# Patient Record
Sex: FEMALE | Race: WHITE | NOT HISPANIC OR LATINO | Employment: OTHER | RURAL
[De-identification: names, ages, dates, MRNs, and addresses within clinical notes are randomized per-mention and may not be internally consistent; named-entity substitution may affect disease eponyms.]

---

## 2022-03-30 ENCOUNTER — OFFICE VISIT (OUTPATIENT)
Dept: FAMILY MEDICINE CLINIC | Facility: CLINIC | Age: 72
End: 2022-03-30

## 2022-03-30 VITALS
HEART RATE: 93 BPM | OXYGEN SATURATION: 96 % | BODY MASS INDEX: 27.16 KG/M2 | WEIGHT: 163 LBS | HEIGHT: 65 IN | DIASTOLIC BLOOD PRESSURE: 100 MMHG | SYSTOLIC BLOOD PRESSURE: 158 MMHG

## 2022-03-30 DIAGNOSIS — E11.9 TYPE 2 DIABETES MELLITUS WITHOUT COMPLICATION, WITHOUT LONG-TERM CURRENT USE OF INSULIN: Primary | ICD-10-CM

## 2022-03-30 DIAGNOSIS — I10 BENIGN ESSENTIAL HYPERTENSION: ICD-10-CM

## 2022-03-30 PROCEDURE — 99214 OFFICE O/P EST MOD 30 MIN: CPT | Performed by: FAMILY MEDICINE

## 2022-03-30 RX ORDER — LISINOPRIL AND HYDROCHLOROTHIAZIDE 12.5; 1 MG/1; MG/1
1 TABLET ORAL DAILY
Qty: 90 TABLET | Refills: 1 | Status: SHIPPED | OUTPATIENT
Start: 2022-03-30 | End: 2022-10-06

## 2022-03-30 RX ORDER — METFORMIN HYDROCHLORIDE 500 MG/1
1 TABLET, EXTENDED RELEASE ORAL 2 TIMES DAILY
COMMUNITY
Start: 2021-11-05 | End: 2022-03-30 | Stop reason: SDUPTHER

## 2022-03-30 RX ORDER — FERROUS SULFATE 325(65) MG
325 TABLET ORAL
COMMUNITY
End: 2022-10-06

## 2022-03-30 RX ORDER — GLIPIZIDE 5 MG/1
1 TABLET, FILM COATED, EXTENDED RELEASE ORAL
COMMUNITY
Start: 2021-11-05 | End: 2022-03-30 | Stop reason: SDUPTHER

## 2022-03-30 RX ORDER — GLIPIZIDE 5 MG/1
5 TABLET, FILM COATED, EXTENDED RELEASE ORAL DAILY
Qty: 90 TABLET | Refills: 1 | Status: SHIPPED | OUTPATIENT
Start: 2022-03-30 | End: 2022-10-06 | Stop reason: SDUPTHER

## 2022-03-30 RX ORDER — METFORMIN HYDROCHLORIDE 500 MG/1
500 TABLET, EXTENDED RELEASE ORAL
Qty: 90 TABLET | Refills: 1 | Status: SHIPPED | OUTPATIENT
Start: 2022-03-30 | End: 2022-10-06 | Stop reason: SDUPTHER

## 2022-03-30 RX ORDER — LISINOPRIL AND HYDROCHLOROTHIAZIDE 12.5; 1 MG/1; MG/1
TABLET ORAL
COMMUNITY
Start: 2021-11-05 | End: 2022-03-30 | Stop reason: SDUPTHER

## 2022-03-30 NOTE — PROGRESS NOTES
Follow Up Office Visit      Date of Visit:  2022   Patient Name: Catherine Moran  : 1950   MRN: [id number]     Chief Complaint:    Chief Complaint   Patient presents with   • Establish Care   • Diabetes       History of Present Illness: Catherine Moran is a 71 y.o. female who is here today for follow up.  She is a new patient to us.  Has being seen by Dr. Hurst.  He is currently on medical leave.  She has a history of hypertension and diabetes.  Blood pressure is somewhat elevated today.  Very anxious person.  I was able to review her previous labs done in February.  Her A1c was very elevated.  She is extremely reluctant to take any medicines at all let alone new medication.  We did have a discussion about how often she is checking her blood pressure and sugars.  HPI      Subjective      Review of Systems:   Review of Systems   Constitutional: Negative for fatigue and fever.   HENT: Negative for congestion and ear pain.    Respiratory: Negative for apnea, cough, chest tightness and shortness of breath.    Cardiovascular: Negative for chest pain.   Gastrointestinal: Negative for abdominal pain, constipation, diarrhea and nausea.   Musculoskeletal: Negative for arthralgias.   Psychiatric/Behavioral: Negative for depressed mood and stress.       Past Medical History:   Past Medical History:   Diagnosis Date   • Anxiety    • Benign essential hypertension     MEDICAL MANAGEMENT   • Impaired fasting glucose     DRUG THERAPY   • Iron deficiency anemia    • Type 2 diabetes mellitus without complication (HCC)        Past Surgical History:   Past Surgical History:   Procedure Laterality Date   • COLONOSCOPY  2014    POLYPS X2; TUBULAR ADENOMAS-REPEAT 5 YEARS   • ENDOSCOPY  2014    GERD SYMPTOMS;NORMAL   • HEMORRHOIDECTOMY  2013       Family History:   Family History   Problem Relation Age of Onset   • Hypertension Mother    • Other Father         BLOOD DISEASE   • Hypertension Father   "      Social History:   Social History     Socioeconomic History   • Marital status:    Tobacco Use   • Smoking status: Never Smoker   • Smokeless tobacco: Never Used       Medications:     Current Outpatient Medications:   •  ferrous sulfate 325 (65 FE) MG tablet, Take 325 mg by mouth Daily With Breakfast., Disp: , Rfl:   •  glipizide (GLUCOTROL XL) 5 MG ER tablet, Take 1 tablet by mouth Daily., Disp: 90 tablet, Rfl: 1  •  lisinopril-hydrochlorothiazide (PRINZIDE,ZESTORETIC) 10-12.5 MG per tablet, Take 1 tablet by mouth Daily., Disp: 90 tablet, Rfl: 1  •  metFORMIN ER (GLUCOPHAGE-XR) 500 MG 24 hr tablet, Take 1 tablet by mouth Daily With Breakfast., Disp: 90 tablet, Rfl: 1    Allergies:   No Known Allergies    Objective     Physical Exam:  Vital Signs:   Vitals:    03/30/22 0955   BP: 158/100   Pulse: 93   SpO2: 96%   Weight: 73.9 kg (163 lb)   Height: 165.1 cm (65\")     Body mass index is 27.12 kg/m².     Physical Exam  Vitals and nursing note reviewed.   Constitutional:       General: She is not in acute distress.     Appearance: Normal appearance. She is not ill-appearing.   HENT:      Head: Normocephalic and atraumatic.      Right Ear: Tympanic membrane and ear canal normal.      Left Ear: Tympanic membrane and ear canal normal.      Nose: Nose normal.   Cardiovascular:      Rate and Rhythm: Normal rate and regular rhythm.      Heart sounds: Normal heart sounds.   Pulmonary:      Effort: Pulmonary effort is normal.      Breath sounds: Normal breath sounds.   Neurological:      Mental Status: She is alert and oriented to person, place, and time. Mental status is at baseline.   Psychiatric:         Mood and Affect: Mood normal.         Procedures    BMI is above normal parameters. Recommendations: nutrition counseling/recommendations        Assessment / Plan      Assessment/Plan:   Diagnoses and all orders for this visit:    1. Type 2 diabetes mellitus without complication, without long-term current use of " insulin (HCC) (Primary)    2. Benign essential hypertension    Other orders  -     glipizide (GLUCOTROL XL) 5 MG ER tablet; Take 1 tablet by mouth Daily.  Dispense: 90 tablet; Refill: 1  -     lisinopril-hydrochlorothiazide (PRINZIDE,ZESTORETIC) 10-12.5 MG per tablet; Take 1 tablet by mouth Daily.  Dispense: 90 tablet; Refill: 1  -     metFORMIN ER (GLUCOPHAGE-XR) 500 MG 24 hr tablet; Take 1 tablet by mouth Daily With Breakfast.  Dispense: 90 tablet; Refill: 1         We have agreed that she will keep a blood pressure and blood sugar log for the next 6 weeks.  She is going to be more aggressive on her diet and exercise.  We will base decisions on adjustments of medication based on the next 6 weeks.  Schedule follow-up visit at that time.  Refilled medications as we are for now.    Follow Up:   Return in about 6 weeks (around 5/11/2022).    Zach Quintanilla  [facility] [place] [facility] [place]

## 2022-05-11 ENCOUNTER — OFFICE VISIT (OUTPATIENT)
Dept: FAMILY MEDICINE CLINIC | Facility: CLINIC | Age: 72
End: 2022-05-11

## 2022-05-11 VITALS
HEIGHT: 65 IN | HEART RATE: 64 BPM | WEIGHT: 164 LBS | SYSTOLIC BLOOD PRESSURE: 136 MMHG | DIASTOLIC BLOOD PRESSURE: 80 MMHG | BODY MASS INDEX: 27.32 KG/M2 | OXYGEN SATURATION: 98 %

## 2022-05-11 DIAGNOSIS — I10 BENIGN ESSENTIAL HYPERTENSION: ICD-10-CM

## 2022-05-11 DIAGNOSIS — E11.9 TYPE 2 DIABETES MELLITUS WITHOUT COMPLICATION, WITHOUT LONG-TERM CURRENT USE OF INSULIN: Primary | ICD-10-CM

## 2022-05-11 PROCEDURE — 99214 OFFICE O/P EST MOD 30 MIN: CPT | Performed by: FAMILY MEDICINE

## 2022-05-11 RX ORDER — DAPAGLIFLOZIN 10 MG/1
10 TABLET, FILM COATED ORAL DAILY
Qty: 30 TABLET | Refills: 2 | Status: SHIPPED | OUTPATIENT
Start: 2022-05-11 | End: 2022-10-06

## 2022-05-11 RX ORDER — BLOOD SUGAR DIAGNOSTIC
STRIP MISCELLANEOUS
COMMUNITY
Start: 2022-04-14 | End: 2022-12-14 | Stop reason: SDUPTHER

## 2022-05-12 NOTE — PROGRESS NOTES
Follow Up Office Visit      Date of Visit:  2022   Patient Name: Catherine Moran  : 1950   MRN: [id number]     Chief Complaint:    Chief Complaint   Patient presents with   • Diabetes       History of Present Illness: Catherine Moran is a 71 y.o. female who is here today for follow up.  Patient comes in today to follow-up on her diabetes and hypertension.  Recent blood work reviewed today.  Patient brought blood pressure readings and blood sugar readings in.  Her blood pressure readings at home and here are better than at our last visit.  Overall I think her current medication management is appropriate for her blood pressure.  Her diabetes however is very greatly uncontrolled.  She is currently taking glipizide and metformin.  Her A1c was extremely elevated and her blood sugars at home are as well.  She has been trying to work on diet.  She is extremely reluctant to take any additional medication.  Her current medications really are not going to make a big difference by increasing them.  She does have side effects of diarrhea with any more metformin.        Subjective      Review of Systems:   Review of Systems   Constitutional: Negative for fatigue and fever.   HENT: Negative for congestion and ear pain.    Respiratory: Negative for apnea, cough, chest tightness and shortness of breath.    Cardiovascular: Negative for chest pain.   Gastrointestinal: Negative for abdominal pain, constipation, diarrhea and nausea.   Musculoskeletal: Negative for arthralgias.   Psychiatric/Behavioral: Negative for depressed mood and stress.       Past Medical History:   Past Medical History:   Diagnosis Date   • Anxiety    • Benign essential hypertension     MEDICAL MANAGEMENT   • Impaired fasting glucose     DRUG THERAPY   • Iron deficiency anemia    • Type 2 diabetes mellitus without complication (HCC)        Past Surgical History:   Past Surgical History:   Procedure Laterality Date   • COLONOSCOPY  2014     "POLYPS X2; TUBULAR ADENOMAS-REPEAT 5 YEARS   • ENDOSCOPY  07/24/2014    GERD SYMPTOMS;NORMAL   • HEMORRHOIDECTOMY  07/31/2013       Family History:   Family History   Problem Relation Age of Onset   • Hypertension Mother    • Other Father         BLOOD DISEASE   • Hypertension Father        Social History:   Social History     Socioeconomic History   • Marital status:    Tobacco Use   • Smoking status: Never Smoker   • Smokeless tobacco: Never Used       Medications:     Current Outpatient Medications:   •  Accu-Chek Arlette Plus test strip, , Disp: , Rfl:   •  Dapagliflozin Propanediol (Farxiga) 10 MG tablet, Take 10 mg by mouth Daily., Disp: 30 tablet, Rfl: 2  •  ferrous sulfate 325 (65 FE) MG tablet, Take 325 mg by mouth Daily With Breakfast., Disp: , Rfl:   •  glipizide (GLUCOTROL XL) 5 MG ER tablet, Take 1 tablet by mouth Daily., Disp: 90 tablet, Rfl: 1  •  lisinopril-hydrochlorothiazide (PRINZIDE,ZESTORETIC) 10-12.5 MG per tablet, Take 1 tablet by mouth Daily., Disp: 90 tablet, Rfl: 1  •  metFORMIN ER (GLUCOPHAGE-XR) 500 MG 24 hr tablet, Take 1 tablet by mouth Daily With Breakfast., Disp: 90 tablet, Rfl: 1    Allergies:   No Known Allergies    Objective     Physical Exam:  Vital Signs:   Vitals:    05/11/22 0955   BP: 136/80   BP Location: Left arm   Patient Position: Sitting   Cuff Size: Adult   Pulse: 64   SpO2: 98%   Weight: 74.4 kg (164 lb)   Height: 165.1 cm (65\")   PainSc: 0-No pain     Body mass index is 27.29 kg/m².     Physical Exam  Vitals and nursing note reviewed.   Constitutional:       General: She is not in acute distress.     Appearance: Normal appearance. She is not ill-appearing.   HENT:      Head: Normocephalic and atraumatic.      Right Ear: Tympanic membrane and ear canal normal.      Left Ear: Tympanic membrane and ear canal normal.      Nose: Nose normal.   Cardiovascular:      Rate and Rhythm: Normal rate and regular rhythm.      Heart sounds: Normal heart sounds.   Pulmonary:      " Effort: Pulmonary effort is normal.      Breath sounds: Normal breath sounds.   Neurological:      Mental Status: She is alert and oriented to person, place, and time. Mental status is at baseline.   Psychiatric:         Mood and Affect: Mood normal.         Procedures    BMI is >= 25 and < 30. (Overweight) The following options were offered after discussion: exercise counseling/recommendations and nutrition counseling/recommendations       Assessment / Plan      Assessment/Plan:   Diagnoses and all orders for this visit:    1. Type 2 diabetes mellitus without complication, without long-term current use of insulin (HCC) (Primary)  -     Cancel: CBC Auto Differential; Future  -     Cancel: Comprehensive Metabolic Panel; Future  -     Cancel: Lipid Panel; Future  -     Cancel: TSH; Future  -     Cancel: Hemoglobin A1c; Future  -     CBC Auto Differential; Future  -     Comprehensive Metabolic Panel; Future  -     Hemoglobin A1c; Future  -     Lipid Panel; Future  -     TSH; Future    2. Benign essential hypertension  -     Cancel: TSH; Future  -     TSH; Future    Other orders  -     Dapagliflozin Propanediol (Farxiga) 10 MG tablet; Take 10 mg by mouth Daily.  Dispense: 30 tablet; Refill: 2         We had a very lengthy discussion today about her medication.  I do think it safe to continue her current medication for her blood pressure.  There is adequate control.  I do not feel that her current regimen with her diabetes is adequately controlling her sugars at all.  Patient very reluctant to take anything else for her blood pressure.  I did give her a prescription for Farxiga to try.  I think this would have less potential side effects and most of the medicines we could give.  I want to see her back in 3 months for evaluation with labs.    Follow Up:   Return in about 3 months (around 8/11/2022) for Recheck.    Zach Quintanilla  [facility] [place] [facility] [place]

## 2022-08-15 ENCOUNTER — TELEPHONE (OUTPATIENT)
Dept: FAMILY MEDICINE CLINIC | Facility: CLINIC | Age: 72
End: 2022-08-15

## 2022-08-15 DIAGNOSIS — E11.9 TYPE 2 DIABETES MELLITUS WITHOUT COMPLICATION, WITHOUT LONG-TERM CURRENT USE OF INSULIN: Primary | ICD-10-CM

## 2022-08-15 DIAGNOSIS — D50.8 OTHER IRON DEFICIENCY ANEMIA: ICD-10-CM

## 2022-08-15 NOTE — TELEPHONE ENCOUNTER
Caller: Catherine Moran    Relationship: Self    Best call back number: [phone]    What orders are you requesting (i.e. lab or imaging): LAB ORDER FOR APPOINTMENT ON 10/06/2022    In what timeframe would the patient need to come in: A WEEK BEFORE HER APPOINTMENT     Where will you receive your lab/imaging services: IN OFFICE     Additional notes: THE PATIENT WOULD LIKE TO KNOW IF SHE CAN HAVE BLOOD WORK DONE FOR HER APPOINTMENT ON 10/06/2022 PLEASE GIVE PATIENT A CALL TO LET HER KNOW IF THAT CAN BE SCHEDULED

## 2022-09-29 ENCOUNTER — LAB (OUTPATIENT)
Dept: FAMILY MEDICINE CLINIC | Facility: CLINIC | Age: 72
End: 2022-09-29

## 2022-09-29 DIAGNOSIS — D50.8 OTHER IRON DEFICIENCY ANEMIA: ICD-10-CM

## 2022-09-29 DIAGNOSIS — E11.9 TYPE 2 DIABETES MELLITUS WITHOUT COMPLICATION, WITHOUT LONG-TERM CURRENT USE OF INSULIN: ICD-10-CM

## 2022-09-29 PROCEDURE — 36415 COLL VENOUS BLD VENIPUNCTURE: CPT | Performed by: FAMILY MEDICINE

## 2022-09-30 ENCOUNTER — TELEPHONE (OUTPATIENT)
Dept: FAMILY MEDICINE CLINIC | Facility: CLINIC | Age: 72
End: 2022-09-30

## 2022-09-30 LAB
ALBUMIN SERPL-MCNC: 4.3 G/DL (ref 3.7–4.7)
ALBUMIN/GLOB SERPL: 1.6 {RATIO} (ref 1.2–2.2)
ALP SERPL-CCNC: 86 IU/L (ref 44–121)
ALT SERPL-CCNC: 17 IU/L (ref 0–32)
AST SERPL-CCNC: 15 IU/L (ref 0–40)
BASOPHILS # BLD AUTO: 0.1 X10E3/UL (ref 0–0.2)
BASOPHILS NFR BLD AUTO: 1 %
BILIRUB SERPL-MCNC: 0.9 MG/DL (ref 0–1.2)
BUN SERPL-MCNC: 14 MG/DL (ref 8–27)
BUN/CREAT SERPL: 19 (ref 12–28)
CALCIUM SERPL-MCNC: 9.8 MG/DL (ref 8.7–10.3)
CHLORIDE SERPL-SCNC: 97 MMOL/L (ref 96–106)
CHOLEST SERPL-MCNC: 238 MG/DL (ref 100–199)
CO2 SERPL-SCNC: 24 MMOL/L (ref 20–29)
CREAT SERPL-MCNC: 0.73 MG/DL (ref 0.57–1)
EGFRCR SERPLBLD CKD-EPI 2021: 88 ML/MIN/1.73
EOSINOPHIL # BLD AUTO: 0 X10E3/UL (ref 0–0.4)
EOSINOPHIL NFR BLD AUTO: 1 %
ERYTHROCYTE [DISTWIDTH] IN BLOOD BY AUTOMATED COUNT: 12 % (ref 11.7–15.4)
FERRITIN SERPL-MCNC: 73 NG/ML (ref 15–150)
FOLATE SERPL-MCNC: >20 NG/ML
GLOBULIN SER CALC-MCNC: 2.7 G/DL (ref 1.5–4.5)
GLUCOSE SERPL-MCNC: 298 MG/DL (ref 70–99)
HBA1C MFR BLD: 11.9 % (ref 4.8–5.6)
HCT VFR BLD AUTO: 43.4 % (ref 34–46.6)
HDLC SERPL-MCNC: 45 MG/DL
HGB BLD-MCNC: 14.2 G/DL (ref 11.1–15.9)
IMM GRANULOCYTES # BLD AUTO: 0 X10E3/UL (ref 0–0.1)
IMM GRANULOCYTES NFR BLD AUTO: 0 %
IRON SATN MFR SERPL: 32 % (ref 15–55)
IRON SERPL-MCNC: 100 UG/DL (ref 27–139)
LDLC SERPL CALC-MCNC: 147 MG/DL (ref 0–99)
LYMPHOCYTES # BLD AUTO: 1.6 X10E3/UL (ref 0.7–3.1)
LYMPHOCYTES NFR BLD AUTO: 24 %
MCH RBC QN AUTO: 30 PG (ref 26.6–33)
MCHC RBC AUTO-ENTMCNC: 32.7 G/DL (ref 31.5–35.7)
MCV RBC AUTO: 92 FL (ref 79–97)
MONOCYTES # BLD AUTO: 0.4 X10E3/UL (ref 0.1–0.9)
MONOCYTES NFR BLD AUTO: 7 %
NEUTROPHILS # BLD AUTO: 4.4 X10E3/UL (ref 1.4–7)
NEUTROPHILS NFR BLD AUTO: 67 %
PLATELET # BLD AUTO: 296 X10E3/UL (ref 150–450)
POTASSIUM SERPL-SCNC: 4.6 MMOL/L (ref 3.5–5.2)
PROT SERPL-MCNC: 7 G/DL (ref 6–8.5)
RBC # BLD AUTO: 4.74 X10E6/UL (ref 3.77–5.28)
SODIUM SERPL-SCNC: 137 MMOL/L (ref 134–144)
T4 FREE SERPL-MCNC: 1.35 NG/DL (ref 0.82–1.77)
TIBC SERPL-MCNC: 308 UG/DL (ref 250–450)
TRIGL SERPL-MCNC: 253 MG/DL (ref 0–149)
TSH SERPL DL<=0.005 MIU/L-ACNC: 2.25 UIU/ML (ref 0.45–4.5)
UIBC SERPL-MCNC: 208 UG/DL (ref 118–369)
VIT B12 SERPL-MCNC: 840 PG/ML (ref 232–1245)
VLDLC SERPL CALC-MCNC: 46 MG/DL (ref 5–40)
WBC # BLD AUTO: 6.5 X10E3/UL (ref 3.4–10.8)

## 2022-09-30 NOTE — TELEPHONE ENCOUNTER
----- Message from Adolph Hurst MD sent at 9/30/2022  8:01 AM EDT -----  THE MESSAGE BELOW IS ABLE TO BE GIVEN BY THE HUB.  THE HUB MAY SCHEDULE A FOLLOW-UP VISIT FOR THE PATIENT IF INDICATED IN THE MESSAGE BELOW...    Please call and let patient know that her recent blood work showed very poor diabetes control with hemoglobin A1c up to 11.9 and poor cholesterol control.    Please have her keep her appointment with us scheduled for October 6th and we will go over labs together and discuss medication options to get better cholesterol and diabetes control given her recent blood work results.    It is very important that we get her diabetes and cholesterol under better control

## 2022-10-06 ENCOUNTER — OFFICE VISIT (OUTPATIENT)
Dept: FAMILY MEDICINE CLINIC | Facility: CLINIC | Age: 72
End: 2022-10-06

## 2022-10-06 VITALS
TEMPERATURE: 97.3 F | HEART RATE: 84 BPM | SYSTOLIC BLOOD PRESSURE: 150 MMHG | WEIGHT: 160.4 LBS | HEIGHT: 65 IN | DIASTOLIC BLOOD PRESSURE: 92 MMHG | BODY MASS INDEX: 26.73 KG/M2 | OXYGEN SATURATION: 95 %

## 2022-10-06 DIAGNOSIS — E78.2 HYPERLIPIDEMIA, MIXED: ICD-10-CM

## 2022-10-06 DIAGNOSIS — Z11.59 NEED FOR HEPATITIS C SCREENING TEST: ICD-10-CM

## 2022-10-06 DIAGNOSIS — Z12.11 SCREENING FOR COLON CANCER: ICD-10-CM

## 2022-10-06 DIAGNOSIS — E11.65 TYPE 2 DIABETES MELLITUS WITH HYPERGLYCEMIA, WITHOUT LONG-TERM CURRENT USE OF INSULIN: ICD-10-CM

## 2022-10-06 DIAGNOSIS — Z00.00 GENERAL MEDICAL EXAM: Primary | ICD-10-CM

## 2022-10-06 DIAGNOSIS — D50.8 IRON DEFICIENCY ANEMIA SECONDARY TO INADEQUATE DIETARY IRON INTAKE: ICD-10-CM

## 2022-10-06 DIAGNOSIS — Z12.31 SCREENING MAMMOGRAM FOR BREAST CANCER: ICD-10-CM

## 2022-10-06 DIAGNOSIS — Z13.820 SCREENING FOR OSTEOPOROSIS: ICD-10-CM

## 2022-10-06 DIAGNOSIS — I10 HYPERTENSION, ESSENTIAL: ICD-10-CM

## 2022-10-06 PROCEDURE — G0439 PPPS, SUBSEQ VISIT: HCPCS | Performed by: FAMILY MEDICINE

## 2022-10-06 PROCEDURE — 99214 OFFICE O/P EST MOD 30 MIN: CPT | Performed by: FAMILY MEDICINE

## 2022-10-06 PROCEDURE — 99397 PER PM REEVAL EST PAT 65+ YR: CPT | Performed by: FAMILY MEDICINE

## 2022-10-06 PROCEDURE — 1170F FXNL STATUS ASSESSED: CPT | Performed by: FAMILY MEDICINE

## 2022-10-06 PROCEDURE — 1159F MED LIST DOCD IN RCRD: CPT | Performed by: FAMILY MEDICINE

## 2022-10-06 PROCEDURE — 96160 PT-FOCUSED HLTH RISK ASSMT: CPT | Performed by: FAMILY MEDICINE

## 2022-10-06 RX ORDER — GLIPIZIDE 10 MG/1
10 TABLET, FILM COATED, EXTENDED RELEASE ORAL DAILY
Qty: 90 TABLET | Refills: 1 | Status: SHIPPED | OUTPATIENT
Start: 2022-10-06 | End: 2023-02-01 | Stop reason: SDUPTHER

## 2022-10-06 RX ORDER — VALSARTAN AND HYDROCHLOROTHIAZIDE 160; 12.5 MG/1; MG/1
1 TABLET, FILM COATED ORAL DAILY
Qty: 90 TABLET | Refills: 1 | Status: SHIPPED | OUTPATIENT
Start: 2022-10-06 | End: 2023-02-01

## 2022-10-06 RX ORDER — LANCETS
EACH MISCELLANEOUS
COMMUNITY
Start: 2022-09-12

## 2022-10-06 RX ORDER — LISINOPRIL AND HYDROCHLOROTHIAZIDE 12.5; 1 MG/1; MG/1
1 TABLET ORAL DAILY
Qty: 90 TABLET | Refills: 1 | Status: CANCELLED | OUTPATIENT
Start: 2022-10-06

## 2022-10-06 RX ORDER — METFORMIN HYDROCHLORIDE 500 MG/1
500 TABLET, EXTENDED RELEASE ORAL
Qty: 90 TABLET | Refills: 1 | Status: SHIPPED | OUTPATIENT
Start: 2022-10-06 | End: 2023-02-01 | Stop reason: SDUPTHER

## 2022-10-06 NOTE — ASSESSMENT & PLAN NOTE
Blood pressure is uncontrolled.  Having problems with ACE induced cough.  We will change her Diovan HCT.  We will follow-up in 1 month.

## 2022-10-06 NOTE — ASSESSMENT & PLAN NOTE
Lipid abnormalities are unchanged.  Lipid-lowering therapy was not prescribed due to patient refusal.  Lipids will be reassessed in 6 months.

## 2022-10-06 NOTE — ASSESSMENT & PLAN NOTE
Reviewed together health maintenance, screening, vaccinations, and advance directive recommendation.    We discussed past-due mammogram, DEXA, and cancer screening, and vaccination update (flu, Prevnar 20, Shingrix, and COVID booster).    She declines vaccination update.    Declines advance directive.    She declines getting mammogram or bone density up-to-date.    She declines colon cancer screening with colonoscopy or Cologuard.    Diabetic eye exam up-to-date.    Diabetic foot exam completed today.

## 2022-10-06 NOTE — ASSESSMENT & PLAN NOTE
Diabetes is worsening.     Unable to give urine sample for microalbumin.    Given cough ongoing with lisinopril and blood pressure being poorly controlled we will change from lisinopril HCTZ to Diovan HCTZ.    She does not want increase her metformin above 500 mg a day given she is concerned that her loose bowel movements contributed to her iron deficiency.  We did go up to 10 mg with her glipizide but I discussed this is not going to be sufficient to get her diabetes under better control given her A1c is over 11.    We did discuss Ozempic and I gave her a sample and instructed her on use today.    We also discussed with her poor diabetes control and difficulty with medications it would be advantageous for her to see endocrinology for further assistance with her diabetes management and to help get her connected with the diabetes educator.    She is willing to see endocrinology as long as she can see the [facility] endocrinologist that comes to the [place] office location.    Scheduling consultation with endocrinology and again reinforced the importance of seeing a diabetic educator.    She continues to decline statin treatment for hyperlipidemia.  Understands importance of cardiovascular risk reduction with statins in diabetics but refuses to take statin medication.

## 2022-10-06 NOTE — PROGRESS NOTES
QUICK REFERENCE INFORMATION:  The ABCs of the Annual Wellness Visit    Subsequent Medicare Wellness Visit    @[email or web address]@    HEALTH RISK ASSESSMENT    1950    Recent Hospitalizations:  No hospitalization(s) within the last year..        Current Medical Providers:  Patient Care Team:  Adolph Hurst MD as PCP - General (Family Medicine)        Smoking Status:  Social History     Tobacco Use   Smoking Status Never Smoker   Smokeless Tobacco Never Used       Alcohol Consumption:  Social History     Substance and Sexual Activity   Alcohol Use Never       Depression Screen:   PHQ-2/PHQ-9 Depression Screening 10/6/2022   Little Interest or Pleasure in Doing Things 0-->not at all   Feeling Down, Depressed or Hopeless 0-->not at all   PHQ-9: Brief Depression Severity Measure Score 0       Health Habits and Functional and Cognitive Screening:  Functional & Cognitive Status 10/6/2022   Do you have difficulty preparing food and eating? No   Do you have difficulty bathing yourself, getting dressed or grooming yourself? No   Do you have difficulty using the toilet? No   Do you have difficulty moving around from place to place? No   Do you have trouble with steps or getting out of a bed or a chair? No   Current Diet Low Carb Diet   Dental Exam Up to date   Eye Exam Up to date   Exercise (times per week) 0 times per week   Current Exercises Include No Regular Exercise   Do you need help using the phone?  No   Are you deaf or do you have serious difficulty hearing?  No   Do you need help with transportation? No   Do you need help shopping? No   Do you need help preparing meals?  No   Do you need help with housework?  No   Do you need help with laundry? No   Do you need help taking your medications? No   Do you need help managing money? No   Do you ever drive or ride in a car without wearing a seat belt? No   Have you felt unusual stress, anger or loneliness in the last month? No   Who do you live with? Alone   If you need  help, do you have trouble finding someone available to you? No   Have you been bothered in the last four weeks by sexual problems? No   Do you have difficulty concentrating, remembering or making decisions? No       Fall Risk Screen:  MELANIA Fall Risk Assessment was completed, and patient is at LOW risk for falls.Assessment completed on:3/30/2022    ACE III MINI        Does the patient have evidence of cognitive impairment? No    Aspirin use counseling: Start ASA 81 mg daily     Recent Lab Results:  CMP:  Lab Results   Component Value Date    BUN 14 09/29/2022    CREATININE 0.73 09/29/2022    BCR 19 09/29/2022     09/29/2022    K 4.6 09/29/2022    CO2 24 09/29/2022    CALCIUM 9.8 09/29/2022    PROTENTOTREF 7.0 09/29/2022    ALBUMIN 4.3 09/29/2022    LABGLOBREF 2.7 09/29/2022    LABIL2 1.6 09/29/2022    BILITOT 0.9 09/29/2022    ALKPHOS 86 09/29/2022    AST 15 09/29/2022    ALT 17 09/29/2022     HbA1c:  Lab Results   Component Value Date    HGBA1C 11.9 (H) 09/29/2022     Microalbumin:  No results found for: MICROALBUR, POCMALB, POCCREAT  Lipid Panel  Lab Results   Component Value Date    TRIG 253 (H) 09/29/2022    HDL 45 09/29/2022     (H) 09/29/2022    AST 15 09/29/2022    ALT 17 09/29/2022       Visual Acuity:  No exam data present    Age-appropriate Screening Schedule:  Refer to the list below for future screening recommendations based on patient's age, sex and/or medical conditions. Orders for these recommended tests are listed in the plan section. The patient has been provided with a written plan.    Health Maintenance   Topic Date Due   • URINE MICROALBUMIN  Never done   • HEMOGLOBIN A1C  03/29/2023   • DIABETIC EYE EXAM  09/09/2023   • LIPID PANEL  09/29/2023   • DIABETIC FOOT EXAM  10/06/2023   • TDAP/TD VACCINES (4 - Td or Tdap) 03/22/2032   • INFLUENZA VACCINE  Discontinued   • DXA SCAN  Discontinued   • ZOSTER VACCINE  Discontinued        Subjective   History of Present Illness    Catherine  Dean is a 72 y.o. female who presents for a Subsequent Wellness Visit.    She is also here to review her recent fasting blood work together.    Review of Walk Score chart data for historical review (copied and edited for chart update):  Here for followup visit and to review recent fasting labs. Fasting labwork done last week.     Diabetes control has continued to worsen.    She continues to feel better with iron infusions and improving iron stores and resolution of her anemia after consultation with hematology)     She has stayed off pravachol. She stopped given concerns with past mild CK elevation. No myalgias or symptoms. Discussed benefits of statins with diabetic patients and recommended restarting - pt again declines and understands increased CV risk being off a statin     Diabetic eye exam with Dr Carvalho Office     Urine microalbumin pos 2/2020 - continues on lisinopril for BP and renal protection.  Unable to give urine sample for microalbumin today    Due for repeat colonoscopy in July 2019 given polyp history. She does not want repeat colonoscopy at this time. Neg take-home FIT testing in Nov 2018 with worsening iron def anemia workup. Does not wan annual FIT testing or Cologuard every 3 yrs.     Past due for pelvic/pap/breast/mammogram - understands but declines    DEXA normal June 2019. Declines repeat DEXA    Tdap uptodate in 2012. Declines [id number], [id number], Prevnar 20, flu, HepA, and shingrix.     Encouraged past-due COVID-19 booster.    CHRONIC CONDITIONS    The following portions of the patient's history were reviewed and updated as appropriate: allergies, current medications, past family history, past medical history, past social history, past surgical history and problem list.    Outpatient Medications Prior to Visit   Medication Sig Dispense Refill   • Accu-Chek Arlette Plus test strip      • glipizide (GLUCOTROL XL) 5 MG ER tablet Take 1 tablet by mouth Daily. 90 tablet 1   •  lisinopril-hydrochlorothiazide (PRINZIDE,ZESTORETIC) 10-12.5 MG per tablet Take 1 tablet by mouth Daily. 90 tablet 1   • metFORMIN ER (GLUCOPHAGE-XR) 500 MG 24 hr tablet Take 1 tablet by mouth Daily With Breakfast. 90 tablet 1   • Accu-Chek Softclix Lancets lancets      • Dapagliflozin Propanediol (Farxiga) 10 MG tablet Take 10 mg by mouth Daily. 30 tablet 2   • ferrous sulfate 325 (65 FE) MG tablet Take 325 mg by mouth Daily With Breakfast.       No facility-administered medications prior to visit.       Patient Active Problem List   Diagnosis   • Type 2 diabetes mellitus without complication (HCC)   • Benign essential hypertension   • Anxiety state   • General medical exam   • Type 2 diabetes mellitus with hyperglycemia, without long-term current use of insulin (HCC)   • Iron deficiency anemia secondary to inadequate dietary iron intake   • Hypertension, essential   • Hyperlipidemia, mixed   • Screening mammogram for breast cancer   • Screening for colon cancer   • Need for hepatitis C screening test   • Screening for osteoporosis       Advance Care Planning:  ACP discussion was held with the patient during this visit. Patient does not have an advance directive, information provided.    Identification of Risk Factors:  Risk factors include: Advance Directive Discussion  Breast Cancer/Mammogram Screening  Cardiovascular risk  Colon Cancer Screening  Dementia/Memory   Fall Risk  Immunizations Discussed/Encouraged (specific immunizations; Influenza, Prevnar 20 (Pneumococcal 20-valent conjugate), Shingrix and COVID19 )  Obesity/Overweight .    Review of Systems   Constitutional: Negative for appetite change, chills, fever and unexpected weight change.   HENT: Negative for hearing loss.    Eyes: Negative for visual disturbance.   Respiratory: Positive for cough. Negative for chest tightness, shortness of breath and wheezing.    Cardiovascular: Negative for chest pain, palpitations and leg swelling.   Gastrointestinal:  Negative for abdominal pain.   Musculoskeletal: Negative for arthralgias, back pain and gait problem.   Skin: Negative for rash.   Neurological: Negative for dizziness and headaches.   Psychiatric/Behavioral: Negative for agitation and confusion. The patient is not nervous/anxious.        Compared to one year ago, the patient feels her physical health is the same.  Compared to one year ago, the patient feels her mental health is the same.    Objective     Physical Exam  Constitutional:       Appearance: She is obese.   HENT:      Head: Normocephalic.      Right Ear: External ear normal.      Left Ear: External ear normal.      Nose: Nose normal.   Eyes:      Pupils: Pupils are equal, round, and reactive to light.   Cardiovascular:      Rate and Rhythm: Normal rate and regular rhythm.      Pulses:           Dorsalis pedis pulses are 1+ on the right side and 1+ on the left side.        Posterior tibial pulses are 1+ on the right side and 1+ on the left side.      Heart sounds: Normal heart sounds.   Pulmonary:      Effort: Pulmonary effort is normal.      Breath sounds: Normal breath sounds.   Musculoskeletal:         General: Normal range of motion.      Cervical back: Normal range of motion.      Right foot: Normal range of motion. No deformity, bunion or foot drop.      Left foot: Normal range of motion. No deformity, bunion or foot drop.   Feet:      Right foot:      Protective Sensation: 10 sites tested. 10 sites sensed.      Skin integrity: Skin integrity normal.      Toenail Condition: Right toenails are normal.      Left foot:      Protective Sensation: 10 sites tested. 10 sites sensed.      Skin integrity: Skin integrity normal.      Toenail Condition: Left toenails are normal.      Comments: Diabetic Foot Exam Performed and Monofilament Test Performed     Skin:     General: Skin is warm and dry.   Neurological:      General: No focal deficit present.      Mental Status: She is alert.   Psychiatric:          "Mood and Affect: Mood normal.         Behavior: Behavior normal.         Thought Content: Thought content normal.          Procedures     Vitals:    10/06/22 0802   BP: 150/92   BP Location: Right arm   Patient Position: Sitting   Cuff Size: Adult   Pulse: 84   Temp: 97.3 °F (36.3 °C)   TempSrc: Temporal   SpO2: 95%   Weight: 72.8 kg (160 lb 6.4 oz)   Height: 165.1 cm (65\")       BMI is >= 25 and <30. (Overweight) The following options were offered after discussion;: exercise counseling/recommendations and nutrition counseling/recommendations      Lab Results   Component Value Date    WBC 6.5 09/29/2022    HGB 14.2 09/29/2022    HCT 43.4 09/29/2022    MCV 92 09/29/2022     09/29/2022     Lab Results   Component Value Date    GLUCOSE 298 (H) 09/29/2022    BUN 14 09/29/2022    CREATININE 0.73 09/29/2022    BCR 19 09/29/2022    K 4.6 09/29/2022    CO2 24 09/29/2022    CALCIUM 9.8 09/29/2022    PROTENTOTREF 7.0 09/29/2022    ALBUMIN 4.3 09/29/2022    LABIL2 1.6 09/29/2022    AST 15 09/29/2022    ALT 17 09/29/2022     Lab Results   Component Value Date    TSH 2.250 09/29/2022     No results found for: PSA  Lab Results   Component Value Date    CHLPL 238 (H) 09/29/2022    TRIG 253 (H) 09/29/2022    HDL 45 09/29/2022     (H) 09/29/2022       Assessment & Plan   Problem List Items Addressed This Visit        Cardiac and Vasculature    Hypertension, essential (Chronic)    Current Assessment & Plan     Blood pressure is uncontrolled.  Having problems with ACE induced cough.  We will change her Diovan HCT.  We will follow-up in 1 month.         Relevant Medications    valsartan-hydrochlorothiazide (Diovan HCT) 160-12.5 MG per tablet    Hyperlipidemia, mixed (Chronic)    Current Assessment & Plan     Lipid abnormalities are unchanged.  Lipid-lowering therapy was not prescribed due to patient refusal.  Lipids will be reassessed in 6 months.            Endocrine and Metabolic    Type 2 diabetes mellitus with " hyperglycemia, without long-term current use of insulin (HCC) (Chronic)    Current Assessment & Plan     Diabetes is worsening.     Unable to give urine sample for microalbumin.    Given cough ongoing with lisinopril and blood pressure being poorly controlled we will change from lisinopril HCTZ to Diovan HCTZ.    She does not want increase her metformin above 500 mg a day given she is concerned that her loose bowel movements contributed to her iron deficiency.  We did go up to 10 mg with her glipizide but I discussed this is not going to be sufficient to get her diabetes under better control given her A1c is over 11.    We did discuss Ozempic and I gave her a sample and instructed her on use today.    We also discussed with her poor diabetes control and difficulty with medications it would be advantageous for her to see endocrinology for further assistance with her diabetes management and to help get her connected with the diabetes educator.    She is willing to see endocrinology as long as she can see the [facility] endocrinologist that comes to the [place] office location.    Scheduling consultation with endocrinology and again reinforced the importance of seeing a diabetic educator.    She continues to decline statin treatment for hyperlipidemia.  Understands importance of cardiovascular risk reduction with statins in diabetics but refuses to take statin medication.         Relevant Medications    metFORMIN ER (GLUCOPHAGE-XR) 500 MG 24 hr tablet    glipizide (GLUCOTROL XL) 10 MG 24 hr tablet    Semaglutide,0.25 or 0.5MG/DOS, (OZEMPIC) 2 MG/1.5ML solution pen-injector    valsartan-hydrochlorothiazide (Diovan HCT) 160-12.5 MG per tablet    Other Relevant Orders    Ambulatory Referral to Endocrinology       Gastrointestinal Abdominal     Screening for colon cancer    Current Assessment & Plan     Declines            Health Encounters    General medical exam - Primary    Current Assessment & Plan     Reviewed  together health maintenance, screening, vaccinations, and advance directive recommendation.    We discussed past-due mammogram, DEXA, and cancer screening, and vaccination update (flu, Prevnar 20, Shingrix, and COVID booster).    She declines vaccination update.    Declines advance directive.    She declines getting mammogram or bone density up-to-date.    She declines colon cancer screening with colonoscopy or Cologuard.    Diabetic eye exam up-to-date.    Diabetic foot exam completed today.            Hematology and Neoplasia    Iron deficiency anemia secondary to inadequate dietary iron intake (Chronic)    Current Assessment & Plan     Hematology follow-up ongoing.  Continues on infusions when needed.            Infectious Diseases    Need for hepatitis C screening test    Current Assessment & Plan       Declines            Musculoskeletal and Injuries    Screening for osteoporosis    Current Assessment & Plan     Declines            Other    Screening mammogram for breast cancer    Current Assessment & Plan     Declines             Patient Self-Management and Personalized Health Advice  The patient has been provided with information about: diet, exercise and weight management and preventive services including:   · Annual Wellness Visit (AWV)  · Bone Density Measurements  · Colorectal Cancer Screening, Colonoscopy  · Colorectal Cancer Screening, Cologuard Test   · Hepatitis C Virus Screening (beneficiaries must fall into one of the following categories to be eligible- high risk for HCV infection, born between 9624-9640, or history of blood transfusion before 1992)  · Pneumococcal Vaccine and Administration  · Screening for Cervical Cancer with Human Papillomavirus (HPV) Tests  · Screening Mammography .    Outpatient Encounter Medications as of 10/6/2022   Medication Sig Dispense Refill   • Accu-Chek Arlette Plus test strip      • glipizide (GLUCOTROL XL) 10 MG 24 hr tablet Take 1 tablet by mouth Daily. 90 tablet 1    • metFORMIN ER (GLUCOPHAGE-XR) 500 MG 24 hr tablet Take 1 tablet by mouth Daily With Breakfast. 90 tablet 1   • [DISCONTINUED] glipizide (GLUCOTROL XL) 5 MG ER tablet Take 1 tablet by mouth Daily. 90 tablet 1   • [DISCONTINUED] lisinopril-hydrochlorothiazide (PRINZIDE,ZESTORETIC) 10-12.5 MG per tablet Take 1 tablet by mouth Daily. 90 tablet 1   • [DISCONTINUED] metFORMIN ER (GLUCOPHAGE-XR) 500 MG 24 hr tablet Take 1 tablet by mouth Daily With Breakfast. 90 tablet 1   • Accu-Chek Softclix Lancets lancets      • Semaglutide,0.25 or 0.5MG/DOS, (OZEMPIC) 2 MG/1.5ML solution pen-injector Inject 0.25 mg under the skin into the appropriate area as directed 1 (One) Time Per Week. 1.5 mL 3   • valsartan-hydrochlorothiazide (Diovan HCT) 160-12.5 MG per tablet Take 1 tablet by mouth Daily. FOR BLOOD PRESSURE (Replaces lisinopril/hctz) 90 tablet 1   • [DISCONTINUED] Dapagliflozin Propanediol (Farxiga) 10 MG tablet Take 10 mg by mouth Daily. 30 tablet 2   • [DISCONTINUED] ferrous sulfate 325 (65 FE) MG tablet Take 325 mg by mouth Daily With Breakfast.       No facility-administered encounter medications on file as of 10/6/2022.       Reviewed use of high risk medication in the elderly: yes  Reviewed for potential of harmful drug interactions in the elderly: yes    Diagnoses and all orders for this visit:    1. General medical exam (Primary)  Assessment & Plan:  Reviewed together health maintenance, screening, vaccinations, and advance directive recommendation.    We discussed past-due mammogram, DEXA, and cancer screening, and vaccination update (flu, Prevnar 20, Shingrix, and COVID booster).    She declines vaccination update.    Declines advance directive.    She declines getting mammogram or bone density up-to-date.    She declines colon cancer screening with colonoscopy or Cologuard.    Diabetic eye exam up-to-date.    Diabetic foot exam completed today.      2. Type 2 diabetes mellitus with hyperglycemia, without  long-term current use of insulin ([facility])  Assessment & Plan:  Diabetes is worsening.     Unable to give urine sample for microalbumin.    Given cough ongoing with lisinopril and blood pressure being poorly controlled we will change from lisinopril HCTZ to Diovan HCTZ.    She does not want increase her metformin above 500 mg a day given she is concerned that her loose bowel movements contributed to her iron deficiency.  We did go up to 10 mg with her glipizide but I discussed this is not going to be sufficient to get her diabetes under better control given her A1c is over 11.    We did discuss Ozempic and I gave her a sample and instructed her on use today.    We also discussed with her poor diabetes control and difficulty with medications it would be advantageous for her to see endocrinology for further assistance with her diabetes management and to help get her connected with the diabetes educator.    She is willing to see endocrinology as long as she can see the [facility] endocrinologist that comes to the [place] office location.    Scheduling consultation with endocrinology and again reinforced the importance of seeing a diabetic educator.    She continues to decline statin treatment for hyperlipidemia.  Understands importance of cardiovascular risk reduction with statins in diabetics but refuses to take statin medication.    Orders:  -     metFORMIN ER (GLUCOPHAGE-XR) 500 MG 24 hr tablet; Take 1 tablet by mouth Daily With Breakfast.  Dispense: 90 tablet; Refill: 1  -     glipizide (GLUCOTROL XL) 10 MG 24 hr tablet; Take 1 tablet by mouth Daily.  Dispense: 90 tablet; Refill: 1  -     Semaglutide,0.25 or 0.5MG/DOS, (OZEMPIC) 2 MG/1.5ML solution pen-injector; Inject 0.25 mg under the skin into the appropriate area as directed 1 (One) Time Per Week.  Dispense: 1.5 mL; Refill: 3  -     Ambulatory Referral to Endocrinology  -     valsartan-hydrochlorothiazide (Diovan HCT) 160-12.5 MG per tablet; Take 1 tablet by  mouth Daily. FOR BLOOD PRESSURE (Replaces lisinopril/hctz)  Dispense: 90 tablet; Refill: 1    3. Iron deficiency anemia secondary to inadequate dietary iron intake  Assessment & Plan:  Hematology follow-up ongoing.  Continues on infusions when needed.      4. Hypertension, essential  Assessment & Plan:  Blood pressure is uncontrolled.  Having problems with ACE induced cough.  We will change her Diovan HCT.  We will follow-up in 1 month.    Orders:  -     valsartan-hydrochlorothiazide (Diovan HCT) 160-12.5 MG per tablet; Take 1 tablet by mouth Daily. FOR BLOOD PRESSURE (Replaces lisinopril/hctz)  Dispense: 90 tablet; Refill: 1    5. Hyperlipidemia, mixed  Assessment & Plan:  Lipid abnormalities are unchanged.  Lipid-lowering therapy was not prescribed due to patient refusal.  Lipids will be reassessed in 6 months.      6. Screening mammogram for breast cancer  Assessment & Plan:  Declines      7. Screening for colon cancer  Assessment & Plan:  Declines      8. Need for hepatitis C screening test  Assessment & Plan:    Declines      9. Screening for osteoporosis  Assessment & Plan:  Declines        Follow Up:  Return in about 3 months (around 1/6/2023) for Med recheck.     There are no Patient Instructions on file for this visit.    An After Visit Summary and PPPS with all of these plans were given to the patient.

## 2022-11-23 ENCOUNTER — TELEPHONE (OUTPATIENT)
Dept: FAMILY MEDICINE CLINIC | Facility: CLINIC | Age: 72
End: 2022-11-23

## 2022-12-14 ENCOUNTER — TELEPHONE (OUTPATIENT)
Dept: FAMILY MEDICINE CLINIC | Facility: CLINIC | Age: 72
End: 2022-12-14

## 2022-12-14 NOTE — TELEPHONE ENCOUNTER
PATIENT MADE CONTACT TO CHECK THE STATUS OF HER EARLIER REQUEST FOR TEST STRIPS TO BE REFILLED AND SENT TO [facility]    PHARMACY CONTACT:    [phone]    PATIENT STATED PHARMACY RECEIVED REFILL   FOR THE LANCETS BUT NOT THE TEST STRIPS, AND SHE IS COMPLETELY OUT OF THE TEST STRIPS AT THIS TIME    DR POLLACK

## 2022-12-14 NOTE — TELEPHONE ENCOUNTER
Caller: Catherine Moran    Relationship: Self    Best call back number: [phone]    Requested Prescriptions:   Requested Prescriptions      No prescriptions requested or ordered in this encounter      LANCETS   TEST STRIPS    Pharmacy where request should be sent:      [facility] [id number] [place] [id number] [facility] [address] - [phone]  - [phone]       Additional details provided by patient:     COMPLETELY OUT OF TEST STRIPS.  THE PRESCRIPTION HAS     Does the patient have less than a 3 day supply:  [x] Yes  [] No    Would you like a call back once the refill request has been completed: [] Yes [x] No    If the office needs to give you a call back, can they leave a voicemail: [x] Yes [] No    Joseu Lopes Rep   22 10:57 EST

## 2022-12-27 ENCOUNTER — TELEPHONE (OUTPATIENT)
Dept: FAMILY MEDICINE CLINIC | Facility: CLINIC | Age: 72
End: 2022-12-27

## 2022-12-27 DIAGNOSIS — D50.8 OTHER IRON DEFICIENCY ANEMIA: ICD-10-CM

## 2022-12-27 DIAGNOSIS — E11.65 TYPE 2 DIABETES MELLITUS WITH HYPERGLYCEMIA, WITHOUT LONG-TERM CURRENT USE OF INSULIN: Primary | ICD-10-CM

## 2022-12-27 NOTE — TELEPHONE ENCOUNTER
Caller: Catherine Moran    Relationship: Self     Best call back number: [phone]    What orders are you requesting (i.e. lab or imaging): BLOOD WORK     In what timeframe would the patient need to come in: N/A    Where will you receive your lab/imaging services:     Additional notes: PATIENT STATED SHE WOULD LIKE TO COME GET BLOOD WORK PRIOR TO HER APPOINTMENT; PATIENT WOULD LIKE TO LET PROVIDER KNOW SHE HAD TO RESCHEDULE WITH ANOTHER PROVIDER SO THAT SHE COULD HAVE MORNING APPOINTMENT AND WANTED TO MAKE SURE HE WAS OKAY WITH THIS    PLEASE ADVISE

## 2023-01-26 ENCOUNTER — LAB (OUTPATIENT)
Dept: FAMILY MEDICINE CLINIC | Facility: CLINIC | Age: 73
End: 2023-01-26
Payer: MEDICARE

## 2023-01-26 DIAGNOSIS — E11.65 TYPE 2 DIABETES MELLITUS WITH HYPERGLYCEMIA, WITHOUT LONG-TERM CURRENT USE OF INSULIN: ICD-10-CM

## 2023-01-26 DIAGNOSIS — D50.8 OTHER IRON DEFICIENCY ANEMIA: ICD-10-CM

## 2023-01-26 PROCEDURE — 36415 COLL VENOUS BLD VENIPUNCTURE: CPT | Performed by: FAMILY MEDICINE

## 2023-01-27 LAB
ALBUMIN SERPL-MCNC: 4.4 G/DL (ref 3.7–4.7)
ALBUMIN/GLOB SERPL: 1.7 {RATIO} (ref 1.2–2.2)
ALP SERPL-CCNC: 86 IU/L (ref 44–121)
ALT SERPL-CCNC: 17 IU/L (ref 0–32)
AST SERPL-CCNC: 21 IU/L (ref 0–40)
BASOPHILS # BLD AUTO: 0 X10E3/UL (ref 0–0.2)
BASOPHILS NFR BLD AUTO: 1 %
BILIRUB SERPL-MCNC: 0.8 MG/DL (ref 0–1.2)
BUN SERPL-MCNC: 14 MG/DL (ref 8–27)
BUN/CREAT SERPL: 19 (ref 12–28)
CALCIUM SERPL-MCNC: 9.5 MG/DL (ref 8.7–10.3)
CHLORIDE SERPL-SCNC: 102 MMOL/L (ref 96–106)
CHOLEST SERPL-MCNC: 229 MG/DL (ref 100–199)
CO2 SERPL-SCNC: 24 MMOL/L (ref 20–29)
CREAT SERPL-MCNC: 0.74 MG/DL (ref 0.57–1)
EGFRCR SERPLBLD CKD-EPI 2021: 86 ML/MIN/1.73
EOSINOPHIL # BLD AUTO: 0.1 X10E3/UL (ref 0–0.4)
EOSINOPHIL NFR BLD AUTO: 1 %
ERYTHROCYTE [DISTWIDTH] IN BLOOD BY AUTOMATED COUNT: 12.8 % (ref 11.7–15.4)
FERRITIN SERPL-MCNC: 31 NG/ML (ref 15–150)
GLOBULIN SER CALC-MCNC: 2.6 G/DL (ref 1.5–4.5)
GLUCOSE SERPL-MCNC: 168 MG/DL (ref 70–99)
HBA1C MFR BLD: 8.6 % (ref 4.8–5.6)
HCT VFR BLD AUTO: 41.2 % (ref 34–46.6)
HDLC SERPL-MCNC: 45 MG/DL
HGB BLD-MCNC: 13.6 G/DL (ref 11.1–15.9)
IMM GRANULOCYTES # BLD AUTO: 0 X10E3/UL (ref 0–0.1)
IMM GRANULOCYTES NFR BLD AUTO: 0 %
IRON SATN MFR SERPL: 20 % (ref 15–55)
IRON SERPL-MCNC: 74 UG/DL (ref 27–139)
LDLC SERPL CALC-MCNC: 137 MG/DL (ref 0–99)
LYMPHOCYTES # BLD AUTO: 1.4 X10E3/UL (ref 0.7–3.1)
LYMPHOCYTES NFR BLD AUTO: 21 %
MCH RBC QN AUTO: 28.4 PG (ref 26.6–33)
MCHC RBC AUTO-ENTMCNC: 33 G/DL (ref 31.5–35.7)
MCV RBC AUTO: 86 FL (ref 79–97)
MONOCYTES # BLD AUTO: 0.5 X10E3/UL (ref 0.1–0.9)
MONOCYTES NFR BLD AUTO: 7 %
NEUTROPHILS # BLD AUTO: 4.7 X10E3/UL (ref 1.4–7)
NEUTROPHILS NFR BLD AUTO: 70 %
PLATELET # BLD AUTO: 298 X10E3/UL (ref 150–450)
POTASSIUM SERPL-SCNC: 4.8 MMOL/L (ref 3.5–5.2)
PROT SERPL-MCNC: 7 G/DL (ref 6–8.5)
RBC # BLD AUTO: 4.79 X10E6/UL (ref 3.77–5.28)
SODIUM SERPL-SCNC: 140 MMOL/L (ref 134–144)
TIBC SERPL-MCNC: 374 UG/DL (ref 250–450)
TRIGL SERPL-MCNC: 263 MG/DL (ref 0–149)
UIBC SERPL-MCNC: 300 UG/DL (ref 118–369)
VLDLC SERPL CALC-MCNC: 47 MG/DL (ref 5–40)
WBC # BLD AUTO: 6.7 X10E3/UL (ref 3.4–10.8)

## 2023-01-27 NOTE — PROGRESS NOTES
THE MESSAGE BELOW IS ABLE TO BE GIVEN BY THE HUB.  THE HUB MAY SCHEDULE A FOLLOW-UP VISIT FOR THE PATIENT IF INDICATED IN THE MESSAGE BELOW...      Please call patient with lab results:    Recent blood work returned with elevated fasting blood sugar at 168 and A1c elevation at 8.6.  Her diabetes control has improved compared to her last labs but still remains poor.  Please have her keep her appointment with me as scheduled in February for follow-up to discuss treatment options for poorly controlled diabetes and also elevated cholesterol.    Her cholesterol level does remain above goal given her diabetes.    Her blood count returned normal with no anemia or evidence for infection.    We are still waiting on her iron studies to return and we will review those together at her appointment.

## 2023-01-31 ENCOUNTER — TELEPHONE (OUTPATIENT)
Dept: FAMILY MEDICINE CLINIC | Facility: CLINIC | Age: 73
End: 2023-01-31
Payer: MEDICARE

## 2023-02-01 ENCOUNTER — OFFICE VISIT (OUTPATIENT)
Dept: FAMILY MEDICINE CLINIC | Facility: CLINIC | Age: 73
End: 2023-02-01
Payer: MEDICARE

## 2023-02-01 VITALS
WEIGHT: 166 LBS | HEART RATE: 94 BPM | HEIGHT: 65 IN | BODY MASS INDEX: 27.66 KG/M2 | SYSTOLIC BLOOD PRESSURE: 140 MMHG | DIASTOLIC BLOOD PRESSURE: 90 MMHG | OXYGEN SATURATION: 95 %

## 2023-02-01 DIAGNOSIS — D50.8 IRON DEFICIENCY ANEMIA SECONDARY TO INADEQUATE DIETARY IRON INTAKE: Chronic | ICD-10-CM

## 2023-02-01 DIAGNOSIS — E78.2 HYPERLIPIDEMIA, MIXED: Chronic | ICD-10-CM

## 2023-02-01 DIAGNOSIS — I10 HYPERTENSION, ESSENTIAL: Chronic | ICD-10-CM

## 2023-02-01 DIAGNOSIS — E11.65 TYPE 2 DIABETES MELLITUS WITH HYPERGLYCEMIA, WITHOUT LONG-TERM CURRENT USE OF INSULIN: Primary | Chronic | ICD-10-CM

## 2023-02-01 PROBLEM — Z12.31 SCREENING MAMMOGRAM FOR BREAST CANCER: Status: RESOLVED | Noted: 2022-10-06 | Resolved: 2023-02-01

## 2023-02-01 PROBLEM — Z11.59 NEED FOR HEPATITIS C SCREENING TEST: Status: RESOLVED | Noted: 2022-10-06 | Resolved: 2023-02-01

## 2023-02-01 PROBLEM — Z12.11 SCREENING FOR COLON CANCER: Status: RESOLVED | Noted: 2022-10-06 | Resolved: 2023-02-01

## 2023-02-01 PROBLEM — Z13.820 SCREENING FOR OSTEOPOROSIS: Status: RESOLVED | Noted: 2022-10-06 | Resolved: 2023-02-01

## 2023-02-01 LAB — POC MICROALBUMIN URINE: 0

## 2023-02-01 PROCEDURE — 3052F HG A1C>EQUAL 8.0%<EQUAL 9.0%: CPT | Performed by: FAMILY MEDICINE

## 2023-02-01 PROCEDURE — 99214 OFFICE O/P EST MOD 30 MIN: CPT | Performed by: FAMILY MEDICINE

## 2023-02-01 PROCEDURE — 82044 UR ALBUMIN SEMIQUANTITATIVE: CPT | Performed by: FAMILY MEDICINE

## 2023-02-01 RX ORDER — METFORMIN HYDROCHLORIDE 500 MG/1
500 TABLET, EXTENDED RELEASE ORAL
Qty: 90 TABLET | Refills: 1 | Status: SHIPPED | OUTPATIENT
Start: 2023-02-01

## 2023-02-01 RX ORDER — ASPIRIN 81 MG/1
81 TABLET ORAL DAILY
Start: 2023-02-01

## 2023-02-01 RX ORDER — VALSARTAN 320 MG/1
320 TABLET ORAL DAILY
Qty: 90 TABLET | Refills: 1 | Status: SHIPPED | OUTPATIENT
Start: 2023-02-01

## 2023-02-01 RX ORDER — GLIPIZIDE 10 MG/1
10 TABLET, FILM COATED, EXTENDED RELEASE ORAL DAILY
Qty: 90 TABLET | Refills: 1 | Status: SHIPPED | OUTPATIENT
Start: 2023-02-01

## 2023-02-01 NOTE — ASSESSMENT & PLAN NOTE
Diabetes is improving with lifestyle modifications.   Continue current treatment regimen.  Diabetes will be reassessed in 3 months.    Reviewed labs with improving diabetes control.  She does not want to adjust her current medications and we did discuss adding in a different statin for cardiovascular risk reduction given her diabetes.  She will consider adding back low-dose aspirin after discussing with hematology.    Discussed her concerns with HCTZ and dehydration and she will go up to 320 with valsartan and stop HCTZ.  Continue home blood pressure checks.    Discussed improving A1c but still above goal.  She wants to hold off on more medications and declines endocrinology consultation at this time.    Discussed the often irreversible problems related to poor diabetes control that can be long-term including diabetic retinopathy, diabetic kidney disease and dialysis, increased cardiovascular and stroke risk, and neuropathy.  She voiced understanding and wants to continue with current regimen and work on diet and exercise before other medications even with her A1c being above 7.    She will return for follow-up in 4 months with plan for fasting labs before follow-up visit.

## 2023-02-01 NOTE — PROGRESS NOTES
"Chief Complaint  Med Refill, Diabetes, Hypertension, and Hyperlipidemia    Subjective    History of Present Illness:  Catherine Moran is a 72 y.o. female who presents today for follow-up visit medication refills.     She continues to feel better with iron infusions and improving iron stores and resolution of her anemia after consultation with hematology)      She has stayed off pravachol. She stopped given concerns with past mild CK elevation. No myalgias or symptoms. Discussed benefits of statins with diabetic patients and recommended restarting - pt again declines and understands increased CV risk being off a statin     Encouraged low-dose aspirin and she will discuss with hematology and consider restarting.     Diabetic eye exam with Dr Carvalho Office      Urine microalbumin Neg 2/1/23.  It has been positive in the past.  She remains on Valsartan/hctz.  She would like to stop HCTZ given concerns for dehydration.  Her blood pressure readings are in the 140s over 80s.  Willing for adjustment in valsartan dosing.     Due for repeat colonoscopy in July 2019 given polyp history. She does not want repeat colonoscopy at this time. Neg take-home FIT testing in Nov 2018 with worsening iron def anemia workup. Does not wan annual FIT testing or Cologuard every 3 yrs.      Past due for pelvic/pap/breast/mammogram - understands but declines     DEXA normal June 2019. Declines repeat DEXA     Tdap uptodate in 2012.     Declines Wswlhmc24, [id number], Prevnar 20, flu, HepA, and shingrix.        Objective   Vital Signs:   /90 (BP Location: Left arm, Patient Position: Sitting, Cuff Size: Adult)   Pulse 94   Ht 165.1 cm (65\")   Wt 75.3 kg (166 lb)   SpO2 95%   BMI 27.62 kg/m²     Review of Systems   Constitutional: Negative for appetite change, chills and fever.   HENT: Negative for hearing loss.    Eyes: Negative for blurred vision.   Respiratory: Negative for chest tightness.    Cardiovascular: Negative for chest " pain.   Gastrointestinal: Negative for abdominal pain.   Musculoskeletal: Negative for gait problem.   Skin: Negative for rash.   Psychiatric/Behavioral: Negative for depressed mood.       Past History:  Medical History: has a past medical history of Anxiety, Benign essential hypertension, Impaired fasting glucose, Iron deficiency anemia, and Type 2 diabetes mellitus without complication (HCC).   Surgical History: has a past surgical history that includes Colonoscopy (07/24/2014); Esophagogastroduodenoscopy (07/24/2014); and Hemorrhoid surgery (07/31/2013).   Family History: family history includes Hypertension in her father and mother; Other in her father.   Social History: reports that she has never smoked. She has never used smokeless tobacco. She reports that she does not drink alcohol and does not use drugs.      Current Outpatient Medications:   •  Accu-Chek Softclix Lancets lancets, , Disp: , Rfl:   •  glipizide (GLUCOTROL XL) 10 MG 24 hr tablet, Take 1 tablet by mouth Daily., Disp: 90 tablet, Rfl: 1  •  glucose blood (Accu-Chek Arlette Plus) test strip, Check blood sugar up to once-a-day fir NIDDM Dx E11.9, Disp: 100 each, Rfl: 11  •  metFORMIN ER (GLUCOPHAGE-XR) 500 MG 24 hr tablet, Take 1 tablet by mouth Daily With Breakfast., Disp: 90 tablet, Rfl: 1  •  aspirin 81 MG EC tablet, Take 1 tablet by mouth Daily., Disp: , Rfl:   •  valsartan (DIOVAN) 320 MG tablet, Take 1 tablet by mouth Daily. OFF HCTZ, Disp: 90 tablet, Rfl: 1    Allergies: Ace inhibitors    Physical Exam  Constitutional:       Appearance: Normal appearance.   HENT:      Head: Normocephalic.      Right Ear: External ear normal.      Left Ear: External ear normal.      Nose: Nose normal.   Eyes:      Pupils: Pupils are equal, round, and reactive to light.   Cardiovascular:      Rate and Rhythm: Normal rate and regular rhythm.      Heart sounds: Normal heart sounds.   Pulmonary:      Effort: Pulmonary effort is normal.      Breath sounds: Normal  breath sounds.   Musculoskeletal:         General: Normal range of motion.      Cervical back: Normal range of motion.   Skin:     General: Skin is warm and dry.   Neurological:      General: No focal deficit present.      Mental Status: She is alert.   Psychiatric:         Mood and Affect: Mood normal.         Behavior: Behavior normal.         Thought Content: Thought content normal.          Result Review                   Assessment and Plan  Diagnoses and all orders for this visit:    1. Type 2 diabetes mellitus with hyperglycemia, without long-term current use of insulin (HCC) (Primary)  Assessment & Plan:  Diabetes is improving with lifestyle modifications.   Continue current treatment regimen.  Diabetes will be reassessed in 3 months.    Reviewed labs with improving diabetes control.  She does not want to adjust her current medications and we did discuss adding in a different statin for cardiovascular risk reduction given her diabetes.  She will consider adding back low-dose aspirin after discussing with hematology.    Discussed her concerns with HCTZ and dehydration and she will go up to 320 with valsartan and stop HCTZ.  Continue home blood pressure checks.    Discussed improving A1c but still above goal.  She wants to hold off on more medications and declines endocrinology consultation at this time.    Discussed the often irreversible problems related to poor diabetes control that can be long-term including diabetic retinopathy, diabetic kidney disease and dialysis, increased cardiovascular and stroke risk, and neuropathy.  She voiced understanding and wants to continue with current regimen and work on diet and exercise before other medications even with her A1c being above 7.    She will return for follow-up in 4 months with plan for fasting labs before follow-up visit.    Orders:  -     POCT microalbumin  -     metFORMIN ER (GLUCOPHAGE-XR) 500 MG 24 hr tablet; Take 1 tablet by mouth Daily With  Breakfast.  Dispense: 90 tablet; Refill: 1  -     glipizide (GLUCOTROL XL) 10 MG 24 hr tablet; Take 1 tablet by mouth Daily.  Dispense: 90 tablet; Refill: 1  -     CBC Auto Differential; Future  -     Comprehensive Metabolic Panel; Future  -     Lipid Panel; Future  -     Hemoglobin A1c; Future  -     TSH; Future  -     T4, Free; Future  -     Vitamin B12; Future  -     Iron Profile; Future  -     Ferritin; Future  -     aspirin 81 MG EC tablet; Take 1 tablet by mouth Daily.  -     glucose blood (Accu-Chek Arlette Plus) test strip; Check blood sugar up to once-a-day fir NIDDM Dx E11.9  Dispense: 100 each; Refill: 11    2. Iron deficiency anemia secondary to inadequate dietary iron intake  Assessment & Plan:  Follow-up ongoing with hematology.    Reviewed normal CBC and iron stores    Orders:  -     CBC Auto Differential; Future  -     Iron Profile; Future  -     Ferritin; Future    3. Hypertension, essential  Assessment & Plan:  See plan above.    Orders:  -     valsartan (DIOVAN) 320 MG tablet; Take 1 tablet by mouth Daily. OFF HCTZ  Dispense: 90 tablet; Refill: 1  -     CBC Auto Differential; Future  -     Comprehensive Metabolic Panel; Future  -     Lipid Panel; Future  -     TSH; Future  -     T4, Free; Future    4. Hyperlipidemia, mixed  Assessment & Plan:  See above.    Encouraged statin treatment.  She declines at this time but will consider trying a different statin in the future.  She did have problems with side effects of pravastatin and mild CK elevation and back pain.    Orders:  -     CBC Auto Differential; Future  -     Comprehensive Metabolic Panel; Future  -     Lipid Panel; Future  -     TSH; Future  -     T4, Free; Future      BMI is >= 25 and <30. (Overweight) The following options were offered after discussion;: exercise counseling/recommendations and nutrition counseling/recommendations          Follow Up  Return in about 4 months (around 6/1/2023) for Fasting labs 1 week before apt (Drink water),  Med recheck.    Adolph Hurst MD

## 2023-02-01 NOTE — ASSESSMENT & PLAN NOTE
See above.    Encouraged statin treatment.  She declines at this time but will consider trying a different statin in the future.  She did have problems with side effects of pravastatin and mild CK elevation and back pain.

## 2023-04-14 ENCOUNTER — TELEPHONE (OUTPATIENT)
Dept: FAMILY MEDICINE CLINIC | Facility: CLINIC | Age: 73
End: 2023-04-14
Payer: MEDICARE

## 2023-04-14 DIAGNOSIS — I10 HYPERTENSION, ESSENTIAL: Primary | ICD-10-CM

## 2023-04-14 RX ORDER — AMLODIPINE BESYLATE 5 MG/1
5 TABLET ORAL DAILY
Qty: 90 TABLET | Refills: 1 | Status: SHIPPED | OUTPATIENT
Start: 2023-04-14

## 2023-04-14 NOTE — TELEPHONE ENCOUNTER
Patient called about the medication because she was confused why we had sent it in for her and she received no instruction on how to take it.  I relayed the message that the provider left in the encounter and the patient expressed understanding.

## 2023-04-14 NOTE — TELEPHONE ENCOUNTER
JAKI PT  Pt called and reports her bp being 145-150/89 or 90   She wants to know if that would be concerning.  I told her I would send Dr. Hurst message

## 2023-04-14 NOTE — TELEPHONE ENCOUNTER
Please let patient know that our goal would be to keep her blood pressure under 140/90.  I did send in low-dose amlodipine that she can add in with her valsartan for better blood pressure control.

## 2023-04-18 DIAGNOSIS — E11.65 TYPE 2 DIABETES MELLITUS WITH HYPERGLYCEMIA, WITHOUT LONG-TERM CURRENT USE OF INSULIN: Primary | ICD-10-CM

## 2023-04-18 RX ORDER — LANCETS
EACH MISCELLANEOUS
Qty: 100 EACH | Refills: 3 | Status: SHIPPED | OUTPATIENT
Start: 2023-04-18

## 2023-04-18 NOTE — TELEPHONE ENCOUNTER
Caller: Catherine Moran    Relationship: Self    Best call back number: [phone]    Requested Prescriptions:   Requested Prescriptions     Pending Prescriptions Disp Refills   • Accu-Chek Softclix Lancets lancets 100 each         Pharmacy where request should be sent: [id number] - [facility] [address] - [phone]  - [phone]      Last office visit with prescribing clinician: 2/1/2023   Last telemedicine visit with prescribing clinician: 5/30/2023   Next office visit with prescribing clinician: 6/6/2023     Additional details provided by patient:     Does the patient have less than a 3 day supply:  [] Yes  [x] No    Would you like a call back once the refill request has been completed: [] Yes [x] No    If the office needs to give you a call back, can they leave a voicemail: [] Yes [x] No    Josue Carvalho Rep   04/18/23 09:12 EDT

## 2023-05-08 ENCOUNTER — TELEPHONE (OUTPATIENT)
Dept: FAMILY MEDICINE CLINIC | Facility: CLINIC | Age: 73
End: 2023-05-08
Payer: MEDICARE

## 2023-05-08 ENCOUNTER — PATIENT OUTREACH (OUTPATIENT)
Dept: CASE MANAGEMENT | Facility: OTHER | Age: 73
End: 2023-05-08
Payer: MEDICARE

## 2023-05-08 DIAGNOSIS — E11.65 TYPE 2 DIABETES MELLITUS WITH HYPERGLYCEMIA, WITHOUT LONG-TERM CURRENT USE OF INSULIN: Chronic | ICD-10-CM

## 2023-05-08 DIAGNOSIS — I10 HYPERTENSION, ESSENTIAL: Primary | Chronic | ICD-10-CM

## 2023-05-08 NOTE — OUTREACH NOTE
AMBULATORY CASE MANAGEMENT NOTE    Name and Relationship of Patient/Support Person: Catherine Moran - Self    CCM Interim Update  Contacted patient and explained CCM services. Patient is not interested.        Education Documentation  No documentation found.        Jazmín CANTRELL  Ambulatory Case Management    5/8/2023, 09:45 EDT

## 2023-08-25 ENCOUNTER — TELEPHONE (OUTPATIENT)
Dept: FAMILY MEDICINE CLINIC | Facility: CLINIC | Age: 73
End: 2023-08-25

## 2023-08-25 NOTE — TELEPHONE ENCOUNTER
PATIENT CALLED BACK STATING SHE NEEDS THE MEDICATION BEFORE THE WEEKEND SO HER SYMPTOMS DON'T GET WORSE. PLEASE CALL PATIENT [phone]     VAGINA CREAM FOR POSSIBLE YEAST INFECTION       [facility]/pharmacy #[id number] [facility], [place] - [address] - [phone] [facility] [phone] FX

## 2023-08-25 NOTE — TELEPHONE ENCOUNTER
Caller: Catherine Moran    Relationship: Self    Best call back number: [phone]     What medication are you requesting: VAGINAL CREAM    What are your current symptoms: BURNING AND ITCHING VAGINALLY    How long have you been experiencing symptoms: 3 TO 4 DAYS    Have you had these symptoms before:    [] Yes  [x] No    Have you been treated for these symptoms before:   [] Yes  [x] No    If a prescription is needed, what is your preferred pharmacy and phone number: [facility] #[id number] - [place] [address] [address] - [phone] [facility] [phone]

## 2023-08-28 NOTE — TELEPHONE ENCOUNTER
Caller: Catherine Moran    Relationship: Self    Best call back number: [phone]     What was the call regarding: PATIENT CALLED NO LONGER NEEDED THE REQUEST FOR A MEDICATION.

## 2023-09-11 ENCOUNTER — OFFICE VISIT (OUTPATIENT)
Dept: FAMILY MEDICINE CLINIC | Facility: CLINIC | Age: 73
End: 2023-09-11
Payer: MEDICARE

## 2023-09-11 VITALS
TEMPERATURE: 97.1 F | SYSTOLIC BLOOD PRESSURE: 138 MMHG | BODY MASS INDEX: 26.96 KG/M2 | DIASTOLIC BLOOD PRESSURE: 92 MMHG | WEIGHT: 162 LBS | OXYGEN SATURATION: 98 % | HEART RATE: 91 BPM

## 2023-09-11 DIAGNOSIS — B37.31 VULVOVAGINAL CANDIDIASIS: Primary | ICD-10-CM

## 2023-09-11 DIAGNOSIS — R19.7 DIARRHEA, UNSPECIFIED TYPE: ICD-10-CM

## 2023-09-11 DIAGNOSIS — R30.0 DYSURIA: ICD-10-CM

## 2023-09-11 LAB
BILIRUB BLD-MCNC: NEGATIVE MG/DL
CLARITY, POC: CLEAR
COLOR UR: YELLOW
EXPIRATION DATE: ABNORMAL
GLUCOSE UR STRIP-MCNC: NEGATIVE MG/DL
KETONES UR QL: NEGATIVE
LEUKOCYTE EST, POC: NEGATIVE
Lab: ABNORMAL
NITRITE UR-MCNC: NEGATIVE MG/ML
PH UR: 5.5 [PH] (ref 5–8)
PROT UR STRIP-MCNC: NEGATIVE MG/DL
RBC # UR STRIP: ABNORMAL /UL
SP GR UR: 1.02 (ref 1–1.03)
UROBILINOGEN UR QL: ABNORMAL

## 2023-09-11 PROCEDURE — 1160F RVW MEDS BY RX/DR IN RCRD: CPT | Performed by: PHYSICIAN ASSISTANT

## 2023-09-11 PROCEDURE — 3046F HEMOGLOBIN A1C LEVEL >9.0%: CPT | Performed by: PHYSICIAN ASSISTANT

## 2023-09-11 PROCEDURE — 99213 OFFICE O/P EST LOW 20 MIN: CPT | Performed by: PHYSICIAN ASSISTANT

## 2023-09-11 PROCEDURE — 81003 URINALYSIS AUTO W/O SCOPE: CPT | Performed by: PHYSICIAN ASSISTANT

## 2023-09-11 PROCEDURE — 3075F SYST BP GE 130 - 139MM HG: CPT | Performed by: PHYSICIAN ASSISTANT

## 2023-09-11 PROCEDURE — 3080F DIAST BP >= 90 MM HG: CPT | Performed by: PHYSICIAN ASSISTANT

## 2023-09-11 PROCEDURE — 1159F MED LIST DOCD IN RCRD: CPT | Performed by: PHYSICIAN ASSISTANT

## 2023-09-11 RX ORDER — FLUCONAZOLE 150 MG/1
150 TABLET ORAL DAILY
Qty: 2 TABLET | Refills: 0 | Status: SHIPPED | OUTPATIENT
Start: 2023-09-11 | End: 2023-09-13

## 2023-09-11 NOTE — PROGRESS NOTES
Office Note     Name: Catherine Moran    : 1950     MRN: [id number]     Chief Complaint  Diarrhea and Vaginitis    Subjective     History of Present Illness:  Catherine Moran is a 72 y.o. female who presents today for eval of diarrhea and vaginal irritation.  She states that she started with a vaginal irritation 1 to 2 weeks ago.  She states that she had discharge and itching.  She has tried zinc oxide, Vagisil, and Monistat.  She has just been Monistat for the last 2 to 3 days.  She states that it has significantly helped her symptoms, but she is still feeling irritated.    She states that she developed the diarrhea 6 days ago after eating barbecue.  She states that she has had it off and on since then, but it was getting a little better.  She states that the Imodium helps when she takes it.  She admits that she did eat some popcorn yesterday, which seemed to get it flared up again.  She states that she has had toast with peanut butter today, and so far she has not had any problems.    Review of Systems:   Review of Systems   Gastrointestinal:  Positive for diarrhea. Negative for abdominal pain, constipation, nausea, vomiting and GERD.   Genitourinary:  Negative for difficulty urinating, dysuria, flank pain, frequency, genital sores, hematuria, pelvic pain, pelvic pressure, vaginal bleeding and vaginal discharge.     Past Medical History:   Past Medical History:   Diagnosis Date    Anxiety     Benign essential hypertension     MEDICAL MANAGEMENT    Impaired fasting glucose     DRUG THERAPY    Iron deficiency anemia     Type 2 diabetes mellitus without complication        Past Surgical History:   Past Surgical History:   Procedure Laterality Date    COLONOSCOPY  2014    POLYPS X2; TUBULAR ADENOMAS-REPEAT 5 YEARS    ENDOSCOPY  2014    GERD SYMPTOMS;NORMAL    HEMORRHOIDECTOMY  2013       Family History:   Family History   Problem Relation Age of Onset    Hypertension Mother     Other  Father         BLOOD DISEASE    Hypertension Father        Social History:   Social History     Socioeconomic History    Marital status:    Tobacco Use    Smoking status: Never    Smokeless tobacco: Never   Vaping Use    Vaping Use: Never used   Substance and Sexual Activity    Alcohol use: Never    Drug use: Never    Sexual activity: Not Currently       Immunizations:   Immunization History   Administered Date(s) Administered    COVID-19 (PFIZER) Purple Cap Monovalent 03/12/2021, 04/06/2021    Td (TDVAX) 08/04/1998    Tdap 09/24/2012, 03/22/2022        Medications:     Current Outpatient Medications:     Accu-Chek Softclix Lancets lancets, Use to check blood sugars up to once a day for non-insulin-dependent diabetes diagnosis E11.9, Disp: 100 each, Rfl: 3    amLODIPine (Norvasc) 5 MG tablet, Take 1 tablet by mouth Daily., Disp: 90 tablet, Rfl: 1    glipizide (GLUCOTROL XL) 10 MG 24 hr tablet, Take 1 tablet by mouth Daily., Disp: 90 tablet, Rfl: 1    glucose blood (Accu-Chek Arlette Plus) test strip, Check blood sugar up to once-a-day fir NIDDM Dx E11.9, Disp: 100 each, Rfl: 11    Insulin Glargine (LANTUS SOLOSTAR) 100 UNIT/ML injection pen, 10 U SC daily for diabetes.  If fasting glucose is over 200 increase by 2U daily.  If fasting blood sugar is above 140 but under 200 - increase by 1 unit daily.  Goal fasting blood sugar 130, Disp: 15 mL, Rfl: 5    Insulin Pen Needle (BD Pen Needle Asya 2nd Gen) 32G X 4 MM misc, Use with pen device as instructed, Disp: 100 each, Rfl: 3    metFORMIN ER (GLUCOPHAGE-XR) 500 MG 24 hr tablet, Take 1 tablet by mouth Daily With Breakfast., Disp: 90 tablet, Rfl: 1    valsartan (DIOVAN) 320 MG tablet, Take 1 tablet by mouth Daily., Disp: 90 tablet, Rfl: 1    aspirin 81 MG EC tablet, Take 1 tablet by mouth Daily. (Patient not taking: Reported on 9/11/2023), Disp: , Rfl:     fluconazole (DIFLUCAN) 150 MG tablet, Take 1 tablet by mouth Daily for 2 doses. On Days 1 and 3., Disp: 2  "tablet, Rfl: 0    Allergies:   Allergies   Allergen Reactions    Ace Inhibitors Cough       Objective     Vital Signs  /92 (BP Location: Left arm, Patient Position: Sitting, Cuff Size: Adult)   Pulse 91   Temp 97.1 °F (36.2 °C) (Temporal)   Wt 73.5 kg (162 lb)   SpO2 98%   BMI 26.96 kg/m²   Estimated body mass index is 26.96 kg/m² as calculated from the following:    Height as of 7/19/23: 165.1 cm (65\").    Weight as of this encounter: 73.5 kg (162 lb).        Physical Exam  Vitals and nursing note reviewed. Exam conducted with a chaperone present.   Constitutional:       General: She is not in acute distress.     Appearance: Normal appearance. She is normal weight. She is not ill-appearing.   HENT:      Head: Normocephalic and atraumatic.   Cardiovascular:      Rate and Rhythm: Normal rate and regular rhythm.      Pulses: Normal pulses.      Heart sounds: Normal heart sounds.   Pulmonary:      Effort: Pulmonary effort is normal. No respiratory distress.      Breath sounds: Normal breath sounds.   Genitourinary:     Vagina: No bleeding.      Rectum: External hemorrhoid present.      Comments: White vaginal discharge and vulvovaginal erythema with superficial erosions  Musculoskeletal:      Right lower leg: No edema.      Left lower leg: No edema.   Skin:     General: Skin is warm and dry.   Neurological:      General: No focal deficit present.      Mental Status: She is alert and oriented to person, place, and time.      Coordination: Coordination normal.      Gait: Gait normal.   Psychiatric:         Mood and Affect: Mood normal.         Behavior: Behavior normal.         Results:  Recent Results (from the past 24 hour(s))   POC Urinalysis Dipstick, Automated    Collection Time: 09/11/23 11:46 AM    Specimen: Urine   Result Value Ref Range    Color Yellow Yellow, Straw, Dark Yellow, Delma    Clarity, UA Clear Clear    Specific Gravity  1.020 1.005 - 1.030    pH, Urine 5.5 5.0 - 8.0    Leukocytes Negative " Negative    Nitrite, UA Negative Negative    Protein, POC Negative Negative mg/dL    Glucose, UA Negative Negative mg/dL    Ketones, UA Negative Negative    Urobilinogen, UA 0.2 E.U./dL Normal, 0.2 E.U./dL    Bilirubin Negative Negative    Blood, UA Trace (A) Negative    Lot Number [id number]     Expiration Date 10,312,024         Assessment and Plan     Assessment/Plan:  Diagnoses and all orders for this visit:    1. Vulvovaginal candidiasis (Primary)  Assessment & Plan:  I will do oral fluconazole to help with treatment.  I advised that she can continue topicals for her symptoms and barrier protection to prevent further irritation.  She is in agreement.    Orders:  -     fluconazole (DIFLUCAN) 150 MG tablet; Take 1 tablet by mouth Daily for 2 doses. On Days 1 and 3.  Dispense: 2 tablet; Refill: 0    2. Diarrhea, unspecified type  Assessment & Plan:  Seems to be improving.  I would recommend that she try a probiotic like Culturelle.  I would also avoid irritating foods like dairy, greasy foods, and spicy foods.  I recommend bland diet for at least the next 3 to 5 days.  I can also do some dicyclomine if symptoms persist.      3. Dysuria  Comments:  Urine is clear.  I would just recommend continuing water intake.  Orders:  -     POC Urinalysis Dipstick, Automated        Follow Up  Return if symptoms worsen or fail to improve.    Romana Constantino PA-C  [facility]

## 2023-09-11 NOTE — ASSESSMENT & PLAN NOTE
Seems to be improving.  I would recommend that she try a probiotic like Culturelle.  I would also avoid irritating foods like dairy, greasy foods, and spicy foods.  I recommend bland diet for at least the next 3 to 5 days.  I can also do some dicyclomine if symptoms persist.

## 2023-11-21 ENCOUNTER — LAB (OUTPATIENT)
Dept: FAMILY MEDICINE CLINIC | Facility: CLINIC | Age: 73
End: 2023-11-21
Payer: MEDICARE

## 2023-11-21 DIAGNOSIS — E78.2 HYPERLIPIDEMIA, MIXED: Chronic | ICD-10-CM

## 2023-11-21 DIAGNOSIS — I10 HYPERTENSION, ESSENTIAL: Chronic | ICD-10-CM

## 2023-11-21 DIAGNOSIS — E11.65 TYPE 2 DIABETES MELLITUS WITH HYPERGLYCEMIA, WITHOUT LONG-TERM CURRENT USE OF INSULIN: Chronic | ICD-10-CM

## 2023-11-21 DIAGNOSIS — D50.8 IRON DEFICIENCY ANEMIA SECONDARY TO INADEQUATE DIETARY IRON INTAKE: Chronic | ICD-10-CM

## 2023-11-22 LAB
ALBUMIN SERPL-MCNC: 4 G/DL (ref 3.8–4.8)
ALBUMIN/GLOB SERPL: 1.5 {RATIO} (ref 1.2–2.2)
ALP SERPL-CCNC: 99 IU/L (ref 44–121)
ALT SERPL-CCNC: 16 IU/L (ref 0–32)
AST SERPL-CCNC: 16 IU/L (ref 0–40)
BASOPHILS # BLD AUTO: 0.1 X10E3/UL (ref 0–0.2)
BASOPHILS NFR BLD AUTO: 1 %
BILIRUB SERPL-MCNC: 0.7 MG/DL (ref 0–1.2)
BUN SERPL-MCNC: 12 MG/DL (ref 8–27)
BUN/CREAT SERPL: 17 (ref 12–28)
CALCIUM SERPL-MCNC: 9.3 MG/DL (ref 8.7–10.3)
CHLORIDE SERPL-SCNC: 101 MMOL/L (ref 96–106)
CHOLEST SERPL-MCNC: 206 MG/DL (ref 100–199)
CO2 SERPL-SCNC: 22 MMOL/L (ref 20–29)
CREAT SERPL-MCNC: 0.69 MG/DL (ref 0.57–1)
EGFRCR SERPLBLD CKD-EPI 2021: 92 ML/MIN/1.73
EOSINOPHIL # BLD AUTO: 0.1 X10E3/UL (ref 0–0.4)
EOSINOPHIL NFR BLD AUTO: 1 %
ERYTHROCYTE [DISTWIDTH] IN BLOOD BY AUTOMATED COUNT: 13.2 % (ref 11.7–15.4)
FERRITIN SERPL-MCNC: 20 NG/ML (ref 15–150)
FOLATE SERPL-MCNC: 10.2 NG/ML
GLOBULIN SER CALC-MCNC: 2.6 G/DL (ref 1.5–4.5)
GLUCOSE SERPL-MCNC: 153 MG/DL (ref 70–99)
HBA1C MFR BLD: 9.4 % (ref 4.8–5.6)
HCT VFR BLD AUTO: 37.5 % (ref 34–46.6)
HDLC SERPL-MCNC: 41 MG/DL
HGB BLD-MCNC: 12.4 G/DL (ref 11.1–15.9)
IMM GRANULOCYTES # BLD AUTO: 0 X10E3/UL (ref 0–0.1)
IMM GRANULOCYTES NFR BLD AUTO: 0 %
IRON SATN MFR SERPL: 18 % (ref 15–55)
IRON SERPL-MCNC: 68 UG/DL (ref 27–139)
LDLC SERPL CALC-MCNC: 131 MG/DL (ref 0–99)
LYMPHOCYTES # BLD AUTO: 2 X10E3/UL (ref 0.7–3.1)
LYMPHOCYTES NFR BLD AUTO: 26 %
MCH RBC QN AUTO: 27.6 PG (ref 26.6–33)
MCHC RBC AUTO-ENTMCNC: 33.1 G/DL (ref 31.5–35.7)
MCV RBC AUTO: 83 FL (ref 79–97)
MONOCYTES # BLD AUTO: 0.6 X10E3/UL (ref 0.1–0.9)
MONOCYTES NFR BLD AUTO: 8 %
NEUTROPHILS # BLD AUTO: 4.8 X10E3/UL (ref 1.4–7)
NEUTROPHILS NFR BLD AUTO: 64 %
PLATELET # BLD AUTO: 312 X10E3/UL (ref 150–450)
POTASSIUM SERPL-SCNC: 4.3 MMOL/L (ref 3.5–5.2)
PROT SERPL-MCNC: 6.6 G/DL (ref 6–8.5)
RBC # BLD AUTO: 4.5 X10E6/UL (ref 3.77–5.28)
SODIUM SERPL-SCNC: 139 MMOL/L (ref 134–144)
T4 FREE SERPL-MCNC: 1.25 NG/DL (ref 0.82–1.77)
TIBC SERPL-MCNC: 372 UG/DL (ref 250–450)
TRIGL SERPL-MCNC: 190 MG/DL (ref 0–149)
TSH SERPL DL<=0.005 MIU/L-ACNC: 2.47 UIU/ML (ref 0.45–4.5)
UIBC SERPL-MCNC: 304 UG/DL (ref 118–369)
VIT B12 SERPL-MCNC: 656 PG/ML (ref 232–1245)
VLDLC SERPL CALC-MCNC: 34 MG/DL (ref 5–40)
WBC # BLD AUTO: 7.6 X10E3/UL (ref 3.4–10.8)

## 2023-11-22 NOTE — PROGRESS NOTES
THE MESSAGE BELOW IS ABLE TO BE GIVEN BY THE HUB.  THE HUB MAY SCHEDULE A FOLLOW-UP VISIT FOR THE PATIENT IF INDICATED IN THE MESSAGE BELOW...    Please contact patient with recent lab work results and encouraged her to keep her appointment as scheduled for November 28, 2023 for follow-up.    Her cholesterol remains mildly elevated but has improved compared to her past labs.    Her comprehensive metabolic panel returned with normal kidney function, normal liver enzymes, good electrolytes, and elevated blood sugar at 153 on the morning of her labs.    Her A1c continues to indicate poor diabetes control and was elevated at 9.4.  Our goal for good diabetes control is to get this under 7.  We will discuss medication change options to improve her diabetes control at her follow-up visit.    Thyroid function returned normal.    Her folic acid level returned normal.    Her vitamin B12 returned normal.    Her blood count returned normal with no anemia or evidence for infection.  Her platelets returned normal.    We are still waiting on her iron profile and ferritin studies to return and we will go over all of her lab work together at her annual wellness visit scheduled for November 28, 2023

## 2023-11-28 ENCOUNTER — OFFICE VISIT (OUTPATIENT)
Dept: FAMILY MEDICINE CLINIC | Facility: CLINIC | Age: 73
End: 2023-11-28
Payer: MEDICARE

## 2023-11-28 VITALS
DIASTOLIC BLOOD PRESSURE: 70 MMHG | WEIGHT: 169 LBS | OXYGEN SATURATION: 95 % | HEART RATE: 95 BPM | BODY MASS INDEX: 28.12 KG/M2 | SYSTOLIC BLOOD PRESSURE: 124 MMHG

## 2023-11-28 DIAGNOSIS — Z00.00 GENERAL MEDICAL EXAM: Primary | ICD-10-CM

## 2023-11-28 DIAGNOSIS — E66.3 OVERWEIGHT (BMI 25.0-29.9): ICD-10-CM

## 2023-11-28 DIAGNOSIS — D50.8 IRON DEFICIENCY ANEMIA SECONDARY TO INADEQUATE DIETARY IRON INTAKE: Chronic | ICD-10-CM

## 2023-11-28 DIAGNOSIS — E78.2 HYPERLIPIDEMIA, MIXED: Chronic | ICD-10-CM

## 2023-11-28 DIAGNOSIS — E11.65 TYPE 2 DIABETES MELLITUS WITH HYPERGLYCEMIA, WITHOUT LONG-TERM CURRENT USE OF INSULIN: Chronic | ICD-10-CM

## 2023-11-28 DIAGNOSIS — D64.9 MILD CHRONIC ANEMIA: ICD-10-CM

## 2023-11-28 DIAGNOSIS — I10 HYPERTENSION, ESSENTIAL: Chronic | ICD-10-CM

## 2023-11-28 PROBLEM — R19.7 DIARRHEA: Status: RESOLVED | Noted: 2023-09-11 | Resolved: 2023-11-28

## 2023-11-28 PROBLEM — B37.31 VULVOVAGINAL CANDIDIASIS: Status: RESOLVED | Noted: 2023-09-11 | Resolved: 2023-11-28

## 2023-11-28 RX ORDER — GLIPIZIDE 10 MG/1
10 TABLET, FILM COATED, EXTENDED RELEASE ORAL DAILY
Qty: 90 TABLET | Refills: 1 | Status: SHIPPED | OUTPATIENT
Start: 2023-11-28

## 2023-11-28 RX ORDER — VALSARTAN 320 MG/1
320 TABLET ORAL DAILY
Qty: 90 TABLET | Refills: 1 | Status: SHIPPED | OUTPATIENT
Start: 2023-11-28

## 2023-11-28 RX ORDER — AMLODIPINE BESYLATE 5 MG/1
5 TABLET ORAL DAILY
Qty: 90 TABLET | Refills: 1 | Status: SHIPPED | OUTPATIENT
Start: 2023-11-28

## 2023-11-28 RX ORDER — METFORMIN HYDROCHLORIDE 500 MG/1
500 TABLET, EXTENDED RELEASE ORAL
Qty: 90 TABLET | Refills: 1 | Status: SHIPPED | OUTPATIENT
Start: 2023-11-28

## 2023-11-28 NOTE — ASSESSMENT & PLAN NOTE
Diabetes is improving with treatment.   Dietary recommendations for ADA diet.  Regular aerobic exercise.  Medication changes per orders.  Diabetes will be reassessed  in 4 months    Lengthy discussion today about insulin adjustment to get fasting blood sugars down into the 130s.  Recheck at follow-up in 4 months or sooner problems arise.

## 2023-11-28 NOTE — ASSESSMENT & PLAN NOTE
Reviewed stable blood count with recent labs.  She has done well without need for recent iron infusion has had extensive work-up with endoscopy and hematology

## 2023-11-28 NOTE — PROGRESS NOTES
QUICK REFERENCE INFORMATION:  The ABCs of the Annual Wellness Visit    Subsequent Medicare Wellness Visit    @[email or web address]@    HEALTH RISK ASSESSMENT    1950    Recent Hospitalizations:  No hospitalization(s) within the last year..        Current Medical Providers:  Patient Care Team:  Adolph Hurst MD as PCP - General (Family Medicine)        Smoking Status:  Social History     Tobacco Use   Smoking Status Never   Smokeless Tobacco Never       Alcohol Consumption:  Social History     Substance and Sexual Activity   Alcohol Use Never       Depression Screen:       2023     9:11 AM   PHQ-2/PHQ-9 Depression Screening   Little Interest or Pleasure in Doing Things 0-->not at all   Feeling Down, Depressed or Hopeless 0-->not at all   PHQ-9: Brief Depression Severity Measure Score 0       Health Habits and Functional and Cognitive Screenin/28/2023     8:00 AM   Functional & Cognitive Status   Do you have difficulty preparing food and eating? No   Do you have difficulty bathing yourself, getting dressed or grooming yourself? No   Do you have difficulty using the toilet? No   Do you have difficulty moving around from place to place? No   Do you have trouble with steps or getting out of a bed or a chair? No   Current Diet Well Balanced Diet   Dental Exam Up to date   Eye Exam Up to date   Exercise (times per week) 7 times per week   Current Exercises Include Walking   Do you need help using the phone?  No   Are you deaf or do you have serious difficulty hearing?  No   Do you need help to go to places out of walking distance? No   Do you need help shopping? No   Do you need help preparing meals?  No   Do you need help with housework?  No   Do you need help with laundry? No   Do you need help taking your medications? No   Do you need help managing money? No   Do you ever drive or ride in a car without wearing a seat belt? No   Have you felt unusual stress, anger or loneliness in the last month? No   Who  do you live with? Alone   If you need help, do you have trouble finding someone available to you? No   Have you been bothered in the last four weeks by sexual problems? No   Do you have difficulty concentrating, remembering or making decisions? No       Fall Risk Screen:  MELANIA Fall Risk Assessment was completed, and patient is at LOW risk for falls.Assessment completed on:2/1/2023    ACE III MINI        Does the patient have evidence of cognitive impairment? No    Aspirin use counseling: Start ASA 81 mg daily     Recent Lab Results:  CMP:  Lab Results   Component Value Date    BUN 12 11/21/2023    CREATININE 0.69 11/21/2023    BCR 17 11/21/2023     11/21/2023    K 4.3 11/21/2023    CO2 22 11/21/2023    CALCIUM 9.3 11/21/2023    PROTENTOTREF 6.6 11/21/2023    ALBUMIN 4.0 11/21/2023    LABGLOBREF 2.6 11/21/2023    LABIL2 1.5 11/21/2023    BILITOT 0.7 11/21/2023    ALKPHOS 99 11/21/2023    AST 16 11/21/2023    ALT 16 11/21/2023     HbA1c:  Lab Results   Component Value Date    HGBA1C 9.4 (H) 11/21/2023    HGBA1C 10.1 (H) 07/12/2023     Microalbumin:  Lab Results   Component Value Date    POCMALB 0 02/01/2023     Lipid Panel  Lab Results   Component Value Date    TRIG 190 (H) 11/21/2023    HDL 41 11/21/2023     (H) 11/21/2023    AST 16 11/21/2023    ALT 16 11/21/2023       Visual Acuity:  No results found.    Age-appropriate Screening Schedule:  Refer to the list below for future screening recommendations based on patient's age, sex and/or medical conditions. Orders for these recommended tests are listed in the plan section. The patient has been provided with a written plan.    Health Maintenance   Topic Date Due    URINE MICROALBUMIN  02/01/2024    HEMOGLOBIN A1C  05/21/2024    COLORECTAL CANCER SCREENING  07/24/2024    MAMMOGRAM  10/06/2024    DIABETIC EYE EXAM  10/16/2024    LIPID PANEL  11/21/2024    ANNUAL WELLNESS VISIT  11/28/2024    DIABETIC FOOT EXAM  11/28/2024    BMI FOLLOWUP  11/28/2024     TDAP/TD VACCINES (4 - Td or Tdap) 03/22/2032    HEPATITIS C SCREENING  Discontinued    COVID-19 Vaccine  Discontinued    INFLUENZA VACCINE  Discontinued    Pneumococcal Vaccine 65+  Discontinued    DXA SCAN  Discontinued    ZOSTER VACCINE  Discontinued        Subjective   History of Present Illness    Catherine Moran is a 73 y.o. female who presents for a Subsequent Wellness Visit and physical exam.    She is also here for followup visit and to review recent labs after starting on insulin in July for her poorly controlled diabetes.  A1c improved a little from 10.1 to 9.4 with insulin started this summer.  However, she was not removing the needle pain And was not giving herself any insulin injections for the first 2 months.  She has started using the injection needles appropriately and her blood sugars have been improving with fasting blood sugars in the 150s.  We did discuss today how to adjust her insulin to get her fasting sugars in the 130s and she did voiced understanding.    Ongoing follow-up with ophthalmology given episode of retinal vein occlusion with findings concerning for hypertensive retinopathy.  She had been reluctant to restart medications given side effects with lisinopril but we did add in amlodipine to her regimen and she has been doing well with home blood pressure checks with a combination of amlodipine and valsartan.  Her blood pressures have been consistently under 140/90.       Her dog has been very sick since her last visit and this has been stressful but they did discover her dog had diabetes and is doing better after starting on insulin.    We have discussed starting statin treatment for cardiovascular risk reduction and aspirin and she declines.  She does understand increased stroke and cardiovascular risk off of statin and aspirin therapy.     CHRONIC CONDITIONS    The following portions of the patient's history were reviewed and updated as appropriate: allergies, current medications,  past family history, past medical history, past social history, past surgical history, and problem list.    Outpatient Medications Prior to Visit   Medication Sig Dispense Refill    Accu-Chek Softclix Lancets lancets Use to check blood sugars up to once a day for non-insulin-dependent diabetes diagnosis E11.9 100 each 3    glucose blood (Accu-Chek Arlette Plus) test strip Check blood sugar up to once-a-day fir NIDDM Dx E11.9 100 each 11    Insulin Pen Needle (BD Pen Needle Asya 2nd Gen) 32G X 4 MM misc Use with pen device as instructed 100 each 3    amLODIPine (Norvasc) 5 MG tablet Take 1 tablet by mouth Daily. 90 tablet 1    glipizide (GLUCOTROL XL) 10 MG 24 hr tablet Take 1 tablet by mouth Daily. 90 tablet 1    Insulin Glargine (LANTUS SOLOSTAR) 100 UNIT/ML injection pen 10 U SC daily for diabetes.  If fasting glucose is over 200 increase by 2U daily.  If fasting blood sugar is above 140 but under 200 - increase by 1 unit daily.  Goal fasting blood sugar 130 15 mL 5    metFORMIN ER (GLUCOPHAGE-XR) 500 MG 24 hr tablet Take 1 tablet by mouth Daily With Breakfast. 90 tablet 1    valsartan (DIOVAN) 320 MG tablet Take 1 tablet by mouth Daily. 90 tablet 1    aspirin 81 MG EC tablet Take 1 tablet by mouth Daily. (Patient not taking: Reported on 11/28/2023)       No facility-administered medications prior to visit.       Patient Active Problem List   Diagnosis    General medical exam    Type 2 diabetes mellitus with hyperglycemia, without long-term current use of insulin    Iron deficiency anemia secondary to inadequate dietary iron intake    Hypertension, essential    Hyperlipidemia, mixed    Mild chronic anemia    Overweight (BMI 25.0-29.9)       Advance Care Planning:  ACP discussion was held with the patient during this visit. Patient does not have an advance directive, information provided.    Identification of Risk Factors:  Risk factors include: Advance Directive Discussion  Fall Risk.    Review of Systems    Constitutional:  Negative for appetite change, chills, fever and unexpected weight change.   HENT:  Negative for hearing loss.    Eyes:  Negative for visual disturbance.   Respiratory:  Negative for chest tightness, shortness of breath and wheezing.    Cardiovascular:  Negative for chest pain, palpitations and leg swelling.   Gastrointestinal:  Negative for abdominal pain.   Musculoskeletal:  Negative for arthralgias, back pain and gait problem.   Skin:  Negative for rash.   Neurological:  Negative for dizziness and headaches.   Psychiatric/Behavioral:  Negative for agitation and confusion. The patient is not nervous/anxious.        Compared to one year ago, the patient feels her physical health is the same.  Compared to one year ago, the patient feels her mental health is the same.    Objective     Physical Exam  Constitutional:       Appearance: Normal appearance.   HENT:      Head: Normocephalic.      Right Ear: External ear normal.      Left Ear: External ear normal.      Nose: Nose normal.   Eyes:      Pupils: Pupils are equal, round, and reactive to light.   Cardiovascular:      Rate and Rhythm: Normal rate and regular rhythm.      Pulses:           Dorsalis pedis pulses are 1+ on the right side and 1+ on the left side.      Heart sounds: Normal heart sounds.   Pulmonary:      Effort: Pulmonary effort is normal.      Breath sounds: Normal breath sounds.   Musculoskeletal:         General: Normal range of motion.      Cervical back: Normal range of motion.      Right foot: Normal range of motion. No deformity, bunion or foot drop.      Left foot: Normal range of motion. No deformity, bunion or foot drop.   Feet:      Right foot:      Protective Sensation: 10 sites tested.  10 sites sensed.      Skin integrity: Skin integrity normal.      Toenail Condition: Right toenails are normal.      Left foot:      Protective Sensation: 10 sites tested.  10 sites sensed.      Skin integrity: Skin integrity normal.       "Toenail Condition: Left toenails are normal.      Comments: Diabetic Foot Exam Performed and Monofilament Test Performed     Skin:     General: Skin is warm and dry.   Neurological:      General: No focal deficit present.      Mental Status: She is alert.   Psychiatric:         Mood and Affect: Mood normal.         Behavior: Behavior normal.         Thought Content: Thought content normal.          Procedures     Vitals:    11/28/23 0805   BP: 124/70   Pulse: 95   SpO2: 95%   Weight: 76.7 kg (169 lb)              Lab Results   Component Value Date    WBC 7.6 11/21/2023    HGB 12.4 11/21/2023    HCT 37.5 11/21/2023    MCV 83 11/21/2023     11/21/2023     Lab Results   Component Value Date    GLUCOSE 153 (H) 11/21/2023    BUN 12 11/21/2023    CREATININE 0.69 11/21/2023    BCR 17 11/21/2023    K 4.3 11/21/2023    CO2 22 11/21/2023    CALCIUM 9.3 11/21/2023    PROTENTOTREF 6.6 11/21/2023    ALBUMIN 4.0 11/21/2023    LABIL2 1.5 11/21/2023    AST 16 11/21/2023    ALT 16 11/21/2023     Lab Results   Component Value Date    TSH 2.470 11/21/2023     No results found for: \"PSA\"  Lab Results   Component Value Date    CHLPL 206 (H) 11/21/2023    TRIG 190 (H) 11/21/2023    HDL 41 11/21/2023     (H) 11/21/2023       Assessment & Plan   Problem List Items Addressed This Visit       General medical exam - Primary    Current Assessment & Plan     Discussed together health maintenance and screening along with vaccination options and healthy diet and exercise habits as part of the preventative counseling at their physical exam today.          Type 2 diabetes mellitus with hyperglycemia, without long-term current use of insulin (Chronic)    Current Assessment & Plan     Diabetes is improving with treatment.   Dietary recommendations for ADA diet.  Regular aerobic exercise.  Medication changes per orders.  Diabetes will be reassessed  in 4 months    Lengthy discussion today about insulin adjustment to get fasting blood " sugars down into the 130s.  Recheck at follow-up in 4 months or sooner problems arise.         Relevant Medications    aspirin 81 MG EC tablet    glucose blood (Accu-Chek Arlette Plus) test strip    Accu-Chek Softclix Lancets lancets    Insulin Pen Needle (BD Pen Needle Asya 2nd Gen) 32G X 4 MM misc    glipizide (GLUCOTROL XL) 10 MG 24 hr tablet    Insulin Glargine (LANTUS SOLOSTAR) 100 UNIT/ML injection pen    metFORMIN ER (GLUCOPHAGE-XR) 500 MG 24 hr tablet    Other Relevant Orders    CBC Auto Differential    Comprehensive Metabolic Panel    Lipid Panel    TSH    T4, Free    Hemoglobin A1c    Iron deficiency anemia secondary to inadequate dietary iron intake (Chronic)    Current Assessment & Plan     Reviewed stable blood count with recent labs.  She has done well without need for recent iron infusion has had extensive work-up with endoscopy and hematology         Hypertension, essential (Chronic)    Current Assessment & Plan     Hypertension is improving with treatment.  Continue current treatment regimen.  Blood pressure will be reassessed at the next regular appointment.         Relevant Medications    amLODIPine (Norvasc) 5 MG tablet    valsartan (DIOVAN) 320 MG tablet    Other Relevant Orders    CBC Auto Differential    Comprehensive Metabolic Panel    Lipid Panel    TSH    T4, Free    Hyperlipidemia, mixed (Chronic)    Current Assessment & Plan     Lipid abnormalities are unchanged.  Lipid-lowering therapy was not prescribed due to patient refusal, previous adverse reaction.  Lipids will be reassessed in 3 months.         Mild chronic anemia    Current Assessment & Plan     We will continue to monitor anemia lab work with future labs         Relevant Orders    CBC Auto Differential    Ferritin    Iron Profile    Folate    Vitamin B12    Overweight (BMI 25.0-29.9)    Current Assessment & Plan     Patient's (Body mass index is 28.12 kg/m².) indicates that they are overweight with health conditions that include  hypertension, diabetes mellitus, and dyslipidemias . Weight is improving with lifestyle modifications. BMI is is above average; BMI management plan is completed. We discussed portion control and increasing exercise.           Patient Self-Management and Personalized Health Advice  The patient has been provided with information about: diet, exercise, and weight management and preventive services including:   Annual Wellness Visit (AWV).    Outpatient Encounter Medications as of 11/28/2023   Medication Sig Dispense Refill    Accu-Chek Softclix Lancets lancets Use to check blood sugars up to once a day for non-insulin-dependent diabetes diagnosis E11.9 100 each 3    amLODIPine (Norvasc) 5 MG tablet Take 1 tablet by mouth Daily. 90 tablet 1    glipizide (GLUCOTROL XL) 10 MG 24 hr tablet Take 1 tablet by mouth Daily. 90 tablet 1    glucose blood (Accu-Chek Arlette Plus) test strip Check blood sugar up to once-a-day fir NIDDM Dx E11.9 100 each 11    Insulin Glargine (LANTUS SOLOSTAR) 100 UNIT/ML injection pen 10 U SC daily for diabetes.  If fasting glucose is over 200 increase by 2U daily.  If fasting blood sugar is above 140 but under 200 - increase by 1 unit daily.  Goal fasting blood sugar 130 15 mL 5    Insulin Pen Needle (BD Pen Needle Asya 2nd Gen) 32G X 4 MM misc Use with pen device as instructed 100 each 3    metFORMIN ER (GLUCOPHAGE-XR) 500 MG 24 hr tablet Take 1 tablet by mouth Daily With Breakfast. 90 tablet 1    valsartan (DIOVAN) 320 MG tablet Take 1 tablet by mouth Daily. 90 tablet 1    [DISCONTINUED] amLODIPine (Norvasc) 5 MG tablet Take 1 tablet by mouth Daily. 90 tablet 1    [DISCONTINUED] glipizide (GLUCOTROL XL) 10 MG 24 hr tablet Take 1 tablet by mouth Daily. 90 tablet 1    [DISCONTINUED] Insulin Glargine (LANTUS SOLOSTAR) 100 UNIT/ML injection pen 10 U SC daily for diabetes.  If fasting glucose is over 200 increase by 2U daily.  If fasting blood sugar is above 140 but under 200 - increase by 1 unit  daily.  Goal fasting blood sugar 130 15 mL 5    [DISCONTINUED] metFORMIN ER (GLUCOPHAGE-XR) 500 MG 24 hr tablet Take 1 tablet by mouth Daily With Breakfast. 90 tablet 1    [DISCONTINUED] valsartan (DIOVAN) 320 MG tablet Take 1 tablet by mouth Daily. 90 tablet 1    aspirin 81 MG EC tablet Take 1 tablet by mouth Daily. (Patient not taking: Reported on 11/28/2023)       No facility-administered encounter medications on file as of 11/28/2023.       Reviewed use of high risk medication in the elderly: yes  Reviewed for potential of harmful drug interactions in the elderly: yes    Diagnoses and all orders for this visit:    1. General medical exam (Primary)  Assessment & Plan:  Discussed together health maintenance and screening along with vaccination options and healthy diet and exercise habits as part of the preventative counseling at their physical exam today.       2. Hypertension, essential  Assessment & Plan:  Hypertension is improving with treatment.  Continue current treatment regimen.  Blood pressure will be reassessed at the next regular appointment.    Orders:  -     amLODIPine (Norvasc) 5 MG tablet; Take 1 tablet by mouth Daily.  Dispense: 90 tablet; Refill: 1  -     valsartan (DIOVAN) 320 MG tablet; Take 1 tablet by mouth Daily.  Dispense: 90 tablet; Refill: 1  -     CBC Auto Differential; Future  -     Comprehensive Metabolic Panel; Future  -     Lipid Panel; Future  -     TSH; Future  -     T4, Free; Future    3. Type 2 diabetes mellitus with hyperglycemia, without long-term current use of insulin  Assessment & Plan:  Diabetes is improving with treatment.   Dietary recommendations for ADA diet.  Regular aerobic exercise.  Medication changes per orders.  Diabetes will be reassessed  in 4 months    Lengthy discussion today about insulin adjustment to get fasting blood sugars down into the 130s.  Recheck at follow-up in 4 months or sooner problems arise.    Orders:  -     glipizide (GLUCOTROL XL) 10 MG 24 hr  tablet; Take 1 tablet by mouth Daily.  Dispense: 90 tablet; Refill: 1  -     Insulin Glargine (LANTUS SOLOSTAR) 100 UNIT/ML injection pen; 10 U SC daily for diabetes.  If fasting glucose is over 200 increase by 2U daily.  If fasting blood sugar is above 140 but under 200 - increase by 1 unit daily.  Goal fasting blood sugar 130  Dispense: 15 mL; Refill: 5  -     metFORMIN ER (GLUCOPHAGE-XR) 500 MG 24 hr tablet; Take 1 tablet by mouth Daily With Breakfast.  Dispense: 90 tablet; Refill: 1  -     CBC Auto Differential; Future  -     Comprehensive Metabolic Panel; Future  -     Lipid Panel; Future  -     TSH; Future  -     T4, Free; Future  -     Hemoglobin A1c; Future    4. Mild chronic anemia  Assessment & Plan:  We will continue to monitor anemia lab work with future labs    Orders:  -     CBC Auto Differential; Future  -     Ferritin; Future  -     Iron Profile; Future  -     Folate; Future  -     Vitamin B12; Future    5. Iron deficiency anemia secondary to inadequate dietary iron intake  Assessment & Plan:  Reviewed stable blood count with recent labs.  She has done well without need for recent iron infusion has had extensive work-up with endoscopy and hematology      6. Hyperlipidemia, mixed  Assessment & Plan:  Lipid abnormalities are unchanged.  Lipid-lowering therapy was not prescribed due to patient refusal, previous adverse reaction.  Lipids will be reassessed in 3 months.      7. Overweight (BMI 25.0-29.9)  Assessment & Plan:  Patient's (Body mass index is 28.12 kg/m².) indicates that they are overweight with health conditions that include hypertension, diabetes mellitus, and dyslipidemias . Weight is improving with lifestyle modifications. BMI is is above average; BMI management plan is completed. We discussed portion control and increasing exercise.            Follow Up:  Return in about 4 months (around 3/28/2024) for Med recheck, Fasting labs 1 week before apt (Drink water).     There are no Patient  Instructions on file for this visit.    An After Visit Summary and PPPS with all of these plans were given to the patient.

## 2023-11-28 NOTE — ASSESSMENT & PLAN NOTE
Patient's (Body mass index is 28.12 kg/m².) indicates that they are overweight with health conditions that include hypertension, diabetes mellitus, and dyslipidemias . Weight is improving with lifestyle modifications. BMI is is above average; BMI management plan is completed. We discussed portion control and increasing exercise.

## 2024-03-26 ENCOUNTER — LAB (OUTPATIENT)
Dept: FAMILY MEDICINE CLINIC | Facility: CLINIC | Age: 74
End: 2024-03-26
Payer: MEDICARE

## 2024-03-26 DIAGNOSIS — E11.65 TYPE 2 DIABETES MELLITUS WITH HYPERGLYCEMIA, WITHOUT LONG-TERM CURRENT USE OF INSULIN: Chronic | ICD-10-CM

## 2024-03-26 DIAGNOSIS — I10 HYPERTENSION, ESSENTIAL: Chronic | ICD-10-CM

## 2024-03-26 DIAGNOSIS — D64.9 MILD CHRONIC ANEMIA: ICD-10-CM

## 2024-03-27 LAB
ALBUMIN SERPL-MCNC: 4 G/DL (ref 3.8–4.8)
ALBUMIN/GLOB SERPL: 1.6 {RATIO} (ref 1.2–2.2)
ALP SERPL-CCNC: 108 IU/L (ref 44–121)
ALT SERPL-CCNC: 21 IU/L (ref 0–32)
AST SERPL-CCNC: 21 IU/L (ref 0–40)
BASOPHILS # BLD AUTO: 0.1 X10E3/UL (ref 0–0.2)
BASOPHILS NFR BLD AUTO: 1 %
BILIRUB SERPL-MCNC: 0.6 MG/DL (ref 0–1.2)
BUN SERPL-MCNC: 10 MG/DL (ref 8–27)
BUN/CREAT SERPL: 13 (ref 12–28)
CALCIUM SERPL-MCNC: 9 MG/DL (ref 8.7–10.3)
CHLORIDE SERPL-SCNC: 101 MMOL/L (ref 96–106)
CHOLEST SERPL-MCNC: 210 MG/DL (ref 100–199)
CO2 SERPL-SCNC: 22 MMOL/L (ref 20–29)
CREAT SERPL-MCNC: 0.78 MG/DL (ref 0.57–1)
EGFRCR SERPLBLD CKD-EPI 2021: 80 ML/MIN/1.73
EOSINOPHIL # BLD AUTO: 0.1 X10E3/UL (ref 0–0.4)
EOSINOPHIL NFR BLD AUTO: 1 %
ERYTHROCYTE [DISTWIDTH] IN BLOOD BY AUTOMATED COUNT: 12.9 % (ref 11.7–15.4)
FERRITIN SERPL-MCNC: 21 NG/ML (ref 15–150)
FOLATE SERPL-MCNC: 11.1 NG/ML
GLOBULIN SER CALC-MCNC: 2.5 G/DL (ref 1.5–4.5)
GLUCOSE SERPL-MCNC: 215 MG/DL (ref 70–99)
HBA1C MFR BLD: 9.5 % (ref 4.8–5.6)
HCT VFR BLD AUTO: 37.2 % (ref 34–46.6)
HDLC SERPL-MCNC: 40 MG/DL
HGB BLD-MCNC: 12.2 G/DL (ref 11.1–15.9)
IMM GRANULOCYTES # BLD AUTO: 0 X10E3/UL (ref 0–0.1)
IMM GRANULOCYTES NFR BLD AUTO: 0 %
IRON SATN MFR SERPL: 16 % (ref 15–55)
IRON SERPL-MCNC: 61 UG/DL (ref 27–139)
LDLC SERPL CALC-MCNC: 131 MG/DL (ref 0–99)
LYMPHOCYTES # BLD AUTO: 1.6 X10E3/UL (ref 0.7–3.1)
LYMPHOCYTES NFR BLD AUTO: 23 %
MCH RBC QN AUTO: 28 PG (ref 26.6–33)
MCHC RBC AUTO-ENTMCNC: 32.8 G/DL (ref 31.5–35.7)
MCV RBC AUTO: 85 FL (ref 79–97)
MONOCYTES # BLD AUTO: 0.5 X10E3/UL (ref 0.1–0.9)
MONOCYTES NFR BLD AUTO: 7 %
NEUTROPHILS # BLD AUTO: 4.7 X10E3/UL (ref 1.4–7)
NEUTROPHILS NFR BLD AUTO: 68 %
PLATELET # BLD AUTO: 306 X10E3/UL (ref 150–450)
POTASSIUM SERPL-SCNC: 4.4 MMOL/L (ref 3.5–5.2)
PROT SERPL-MCNC: 6.5 G/DL (ref 6–8.5)
RBC # BLD AUTO: 4.36 X10E6/UL (ref 3.77–5.28)
SODIUM SERPL-SCNC: 138 MMOL/L (ref 134–144)
T4 FREE SERPL-MCNC: 1.28 NG/DL (ref 0.82–1.77)
TIBC SERPL-MCNC: 380 UG/DL (ref 250–450)
TRIGL SERPL-MCNC: 220 MG/DL (ref 0–149)
TSH SERPL DL<=0.005 MIU/L-ACNC: 2.98 UIU/ML (ref 0.45–4.5)
UIBC SERPL-MCNC: 319 UG/DL (ref 118–369)
VIT B12 SERPL-MCNC: 634 PG/ML (ref 232–1245)
VLDLC SERPL CALC-MCNC: 39 MG/DL (ref 5–40)
WBC # BLD AUTO: 6.9 X10E3/UL (ref 3.4–10.8)

## 2024-03-27 NOTE — PROGRESS NOTES
THE MESSAGE BELOW IS ABLE TO BE GIVEN BY THE HUB.  THE HUB MAY SCHEDULE A FOLLOW-UP VISIT FOR THE PATIENT IF INDICATED IN THE MESSAGE BELOW...    Please call patient with recent lab work results:    Her recent lipid panel returned with ongoing mild cholesterol elevation.  Her LDL was still elevated at 131 and her goal with diabetes is to keep this under 100.  I would again recommend that she consider treatment with a cholesterol medicine to help with heart protection and stroke risk reduction given her diabetes.    Her comprehensive metabolic panel returned with elevated blood sugar at 215 on the morning of her labs which is worsened compared to her past labs.  Her kidney function returned normal.  Your liver enzymes returned normal.  Her electrolytes returned normal.    Her A1c returned elevated at 9.5 and has worsened compared to her past labs.  Please have her keep her appointment as scheduled for next week so we can discuss medication change options to improve her diabetes control.  Please make sure that she can give me a urine sample at follow-up so we can check her urine microalbumin level and see if there is any evidence of her diabetes affecting her kidneys.    Her folic acid level returned normal.    Her vitamin B12 returned normal.    Her thyroid function returned normal.    Her blood count returned normal with no anemia or evidence for infection.  We are still waiting on her iron studies to return and we will go over those results in detail at her follow-up visit next week

## 2024-04-02 ENCOUNTER — OFFICE VISIT (OUTPATIENT)
Dept: FAMILY MEDICINE CLINIC | Facility: CLINIC | Age: 74
End: 2024-04-02
Payer: MEDICARE

## 2024-04-02 VITALS
SYSTOLIC BLOOD PRESSURE: 130 MMHG | OXYGEN SATURATION: 96 % | BODY MASS INDEX: 28.79 KG/M2 | DIASTOLIC BLOOD PRESSURE: 80 MMHG | WEIGHT: 173 LBS | HEART RATE: 91 BPM

## 2024-04-02 DIAGNOSIS — D50.8 IRON DEFICIENCY ANEMIA SECONDARY TO INADEQUATE DIETARY IRON INTAKE: ICD-10-CM

## 2024-04-02 DIAGNOSIS — Z79.4 TYPE 2 DIABETES MELLITUS WITH DIABETIC MICROALBUMINURIA, WITH LONG-TERM CURRENT USE OF INSULIN: ICD-10-CM

## 2024-04-02 DIAGNOSIS — E11.65 TYPE 2 DIABETES MELLITUS WITH HYPERGLYCEMIA, WITH LONG-TERM CURRENT USE OF INSULIN: Primary | ICD-10-CM

## 2024-04-02 DIAGNOSIS — E66.3 OVERWEIGHT (BMI 25.0-29.9): ICD-10-CM

## 2024-04-02 DIAGNOSIS — Z79.4 TYPE 2 DIABETES MELLITUS WITH HYPERGLYCEMIA, WITH LONG-TERM CURRENT USE OF INSULIN: Primary | ICD-10-CM

## 2024-04-02 DIAGNOSIS — I10 HYPERTENSION, ESSENTIAL: ICD-10-CM

## 2024-04-02 DIAGNOSIS — E78.2 HYPERLIPIDEMIA, MIXED: ICD-10-CM

## 2024-04-02 DIAGNOSIS — R80.9 TYPE 2 DIABETES MELLITUS WITH DIABETIC MICROALBUMINURIA, WITH LONG-TERM CURRENT USE OF INSULIN: ICD-10-CM

## 2024-04-02 DIAGNOSIS — E11.29 TYPE 2 DIABETES MELLITUS WITH DIABETIC MICROALBUMINURIA, WITH LONG-TERM CURRENT USE OF INSULIN: ICD-10-CM

## 2024-04-02 DIAGNOSIS — D64.9 MILD CHRONIC ANEMIA: ICD-10-CM

## 2024-04-02 LAB
EXPIRATION DATE: NORMAL
Lab: NORMAL
POC CREATININE URINE: 100
POC MICROALBUMIN URINE: 30

## 2024-04-02 RX ORDER — METFORMIN HYDROCHLORIDE 500 MG/1
500 TABLET, EXTENDED RELEASE ORAL
Qty: 90 TABLET | Refills: 1 | Status: SHIPPED | OUTPATIENT
Start: 2024-04-02

## 2024-04-02 RX ORDER — VALSARTAN 320 MG/1
320 TABLET ORAL DAILY
Qty: 90 TABLET | Refills: 1 | Status: SHIPPED | OUTPATIENT
Start: 2024-04-02

## 2024-04-02 RX ORDER — AMLODIPINE BESYLATE 5 MG/1
5 TABLET ORAL DAILY
Qty: 90 TABLET | Refills: 1 | Status: SHIPPED | OUTPATIENT
Start: 2024-04-02

## 2024-04-02 RX ORDER — GLIPIZIDE 10 MG/1
10 TABLET, FILM COATED, EXTENDED RELEASE ORAL DAILY
Qty: 90 TABLET | Refills: 1 | Status: SHIPPED | OUTPATIENT
Start: 2024-04-02

## 2024-04-02 NOTE — PROGRESS NOTES
Chief Complaint  DM, HTN, Hyperlipidemia, history of mild anemia and chronic iron deficiency    Subjective    History of Present Illness:  Catherine Moran is a 73 y.o. female who presents today for followup visit and to review recent labs.    We did start her on insulin in July 2023 for her poorly controlled diabetes.  A1c improved a little from 10.1 to 9.4 with last visit and was up to 9.5 with recent labs.  She felt at her last visit her A1c was not better given she had problems with use of the insulin pen that had been corrected.  We again reviewed adjustment with insulin today - going up by 2U if fasting glucose is over 200 and 1U if glucose is above 140 but under 200.  She did again voice understanding. She is only at 11U and going to increase to 12.. and we discussed continued adjustments to get fasting sugars in the 130s!     Ongoing follow-up with ophthalmology given episode of retinal vein occlusion with findings concerning for hypertensive retinopathy.  She had been reluctant to restart medications given side effects with lisinopril but we did add in amlodipine to her valsartan regimen last year and she has been doing well with home blood pressure checks with a combination of amlodipine and valsartan.  Her blood pressures have been consistently under 140/90.      We have discussed starting statin treatment again (she was on pravachol in the past) for cardiovascular risk reduction and aspirin and she declines.     She does understand increased stroke and cardiovascular risk off of statin and aspirin therapy.    Objective   Vital Signs:   /80   Pulse 91   Wt 78.5 kg (173 lb)   SpO2 96%   BMI 28.79 kg/m²     Review of Systems   Constitutional:  Negative for appetite change, chills and fever.   HENT:  Negative for hearing loss.    Eyes:  Negative for blurred vision.   Respiratory:  Negative for chest tightness.    Cardiovascular:  Negative for chest pain.   Gastrointestinal:  Negative for abdominal  pain.   Musculoskeletal:  Negative for gait problem.   Skin:  Negative for rash.   Psychiatric/Behavioral:  Negative for depressed mood.        Past History:  Medical History: has a past medical history of Anxiety, Benign essential hypertension, Impaired fasting glucose, Iron deficiency anemia, and Type 2 diabetes mellitus without complication.   Surgical History: has a past surgical history that includes Colonoscopy (07/24/2014); Esophagogastroduodenoscopy (07/24/2014); and Hemorrhoid surgery (07/31/2013).   Family History: family history includes Hypertension in her father and mother; Other in her father.   Social History: reports that she has never smoked. She has never used smokeless tobacco. She reports that she does not drink alcohol and does not use drugs.      Current Outpatient Medications:     Accu-Chek Softclix Lancets lancets, Use to check blood sugars up to once a day for non-insulin-dependent diabetes diagnosis E11.9, Disp: 100 each, Rfl: 3    amLODIPine (Norvasc) 5 MG tablet, Take 1 tablet by mouth Daily., Disp: 90 tablet, Rfl: 1    glipizide (GLUCOTROL XL) 10 MG 24 hr tablet, Take 1 tablet by mouth Daily., Disp: 90 tablet, Rfl: 1    glucose blood (Accu-Chek Arlette Plus) test strip, Check blood sugar up to once-a-day fir NIDDM Dx E11.9, Disp: 100 each, Rfl: 11    Insulin Glargine (LANTUS SOLOSTAR) 100 UNIT/ML injection pen, 12U SC daily for diabetes.  If fasting glucose is over 200 increase by 2U daily.  If fasting blood sugar is above 140 but under 200 - increase by 1 unit daily.  Goal fasting blood sugar 130, Disp: 15 mL, Rfl: 5    Insulin Pen Needle (BD Pen Needle Asya 2nd Gen) 32G X 4 MM misc, Use with pen device as instructed, Disp: 100 each, Rfl: 3    metFORMIN ER (GLUCOPHAGE-XR) 500 MG 24 hr tablet, Take 1 tablet by mouth Daily With Breakfast., Disp: 90 tablet, Rfl: 1    valsartan (DIOVAN) 320 MG tablet, Take 1 tablet by mouth Daily., Disp: 90 tablet, Rfl: 1    aspirin 81 MG EC tablet, Take 1  tablet by mouth Daily. (Patient not taking: Reported on 4/2/2024), Disp: , Rfl:     Allergies: Ace inhibitors    Physical Exam  Constitutional:       Appearance: Normal appearance.   HENT:      Head: Normocephalic.      Right Ear: External ear normal.      Left Ear: External ear normal.      Nose: Nose normal.   Eyes:      Pupils: Pupils are equal, round, and reactive to light.   Cardiovascular:      Rate and Rhythm: Normal rate and regular rhythm.      Heart sounds: Normal heart sounds.   Pulmonary:      Effort: Pulmonary effort is normal.      Breath sounds: Normal breath sounds.   Musculoskeletal:         General: Normal range of motion.      Cervical back: Normal range of motion.   Skin:     General: Skin is warm and dry.   Neurological:      General: No focal deficit present.      Mental Status: She is alert.   Psychiatric:         Mood and Affect: Mood normal.         Behavior: Behavior normal.         Thought Content: Thought content normal.          Result Review                   Assessment and Plan  Diagnoses and all orders for this visit:    1. Type 2 diabetes mellitus with hyperglycemia, with long-term current use of insulin (Primary)  Assessment & Plan:  Diabetes is unchanged.   Dietary recommendations for ADA diet.  Regular aerobic exercise.  Medication changes per orders.  Diabetes will be reassessed  in 4 months    Lengthy discussion today about insulin adjustment to get fasting blood sugars down into the 130s.  Recheck at follow-up in 4 months or sooner problems arise.    Continues to decline ASA and statin treatment for CV risk reduction with diabetes and understands increased risk off ASA and statin therapy     Orders:  -     POCT microalbumin  -     CBC Auto Differential; Future  -     Comprehensive Metabolic Panel; Future  -     Lipid Panel; Future  -     TSH; Future  -     T4, Free; Future  -     Hemoglobin A1c; Future  -     metFORMIN ER (GLUCOPHAGE-XR) 500 MG 24 hr tablet; Take 1 tablet by  mouth Daily With Breakfast.  Dispense: 90 tablet; Refill: 1  -     glipizide (GLUCOTROL XL) 10 MG 24 hr tablet; Take 1 tablet by mouth Daily.  Dispense: 90 tablet; Refill: 1  -     Insulin Glargine (LANTUS SOLOSTAR) 100 UNIT/ML injection pen; 12U SC daily for diabetes.  If fasting glucose is over 200 increase by 2U daily.  If fasting blood sugar is above 140 but under 200 - increase by 1 unit daily.  Goal fasting blood sugar 130  Dispense: 15 mL; Refill: 5    2. Type 2 diabetes mellitus with diabetic microalbuminuria, with long-term current use of insulin  -     POCT microalbumin  -     CBC Auto Differential; Future  -     Comprehensive Metabolic Panel; Future  -     Lipid Panel; Future  -     TSH; Future  -     T4, Free; Future  -     Hemoglobin A1c; Future  -     valsartan (DIOVAN) 320 MG tablet; Take 1 tablet by mouth Daily.  Dispense: 90 tablet; Refill: 1  -     metFORMIN ER (GLUCOPHAGE-XR) 500 MG 24 hr tablet; Take 1 tablet by mouth Daily With Breakfast.  Dispense: 90 tablet; Refill: 1  -     glipizide (GLUCOTROL XL) 10 MG 24 hr tablet; Take 1 tablet by mouth Daily.  Dispense: 90 tablet; Refill: 1  -     Insulin Glargine (LANTUS SOLOSTAR) 100 UNIT/ML injection pen; 12U SC daily for diabetes.  If fasting glucose is over 200 increase by 2U daily.  If fasting blood sugar is above 140 but under 200 - increase by 1 unit daily.  Goal fasting blood sugar 130  Dispense: 15 mL; Refill: 5    3. Hypertension, essential  Assessment & Plan:  Hypertension is stable and controlled  Continue current treatment regimen.  Weight loss.  Regular aerobic exercise.  Blood pressure will be reassessed  in 4 mos .    Orders:  -     CBC Auto Differential; Future  -     Comprehensive Metabolic Panel; Future  -     Lipid Panel; Future  -     TSH; Future  -     T4, Free; Future  -     amLODIPine (Norvasc) 5 MG tablet; Take 1 tablet by mouth Daily.  Dispense: 90 tablet; Refill: 1  -     valsartan (DIOVAN) 320 MG tablet; Take 1 tablet by  mouth Daily.  Dispense: 90 tablet; Refill: 1    4. Hyperlipidemia, mixed  Assessment & Plan:  Lipid abnormalities are unchanged.  Lipid-lowering therapy was not prescribed due to patient refusal, previous adverse reaction.  Lipids will be reassessed  4 mos      See above    Declines any Rx lipid medications.    Orders:  -     CBC Auto Differential; Future  -     Comprehensive Metabolic Panel; Future  -     Lipid Panel; Future  -     TSH; Future  -     T4, Free; Future    5. Mild chronic anemia  Assessment & Plan:  We will continue to monitor anemia lab work with future labs    Labs stable compared to past labs    Orders:  -     CBC Auto Differential; Future  -     Ferritin; Future  -     Iron Profile; Future    6. Iron deficiency anemia secondary to inadequate dietary iron intake  Assessment & Plan:  Reviewed stable blood count with recent labs.  She has done well without need for recent iron infusion has had extensive work-up with endoscopy and hematology    Orders:  -     CBC Auto Differential; Future  -     Ferritin; Future  -     Iron Profile; Future    7. Overweight (BMI 25.0-29.9)  Assessment & Plan:  Patient's (Body mass index is 28.79 kg/m².) indicates that they are overweight with health conditions that include hypertension, diabetes mellitus, and dyslipidemias . Weight is improving with lifestyle modifications. BMI is is above average; BMI management plan is completed. We discussed portion control and increasing exercise.                      Follow Up  Return in about 4 months (around 8/2/2024) for Med recheck, Fasting labs 1 week before apt (Drink water).    Adolph Hurst MD

## 2024-04-02 NOTE — ASSESSMENT & PLAN NOTE
Lipid abnormalities are unchanged.  Lipid-lowering therapy was not prescribed due to patient refusal, previous adverse reaction.  Lipids will be reassessed  4 mos      See above    Declines any Rx lipid medications.

## 2024-04-02 NOTE — ASSESSMENT & PLAN NOTE
Patient's (Body mass index is 28.79 kg/m².) indicates that they are overweight with health conditions that include hypertension, diabetes mellitus, and dyslipidemias . Weight is improving with lifestyle modifications. BMI is is above average; BMI management plan is completed. We discussed portion control and increasing exercise.

## 2024-04-02 NOTE — ASSESSMENT & PLAN NOTE
Hypertension is stable and controlled  Continue current treatment regimen.  Weight loss.  Regular aerobic exercise.  Blood pressure will be reassessed  in 4 mos .

## 2024-04-02 NOTE — ASSESSMENT & PLAN NOTE
Diabetes is unchanged.   Dietary recommendations for ADA diet.  Regular aerobic exercise.  Medication changes per orders.  Diabetes will be reassessed  in 4 months    Lengthy discussion today about insulin adjustment to get fasting blood sugars down into the 130s.  Recheck at follow-up in 4 months or sooner problems arise.    Continues to decline ASA and statin treatment for CV risk reduction with diabetes and understands increased risk off ASA and statin therapy

## 2024-04-06 DIAGNOSIS — E11.65 TYPE 2 DIABETES MELLITUS WITH HYPERGLYCEMIA, WITHOUT LONG-TERM CURRENT USE OF INSULIN: Chronic | ICD-10-CM

## 2024-04-06 RX ORDER — BLOOD SUGAR DIAGNOSTIC
STRIP MISCELLANEOUS
Qty: 100 EACH | Refills: 11 | Status: SHIPPED | OUTPATIENT
Start: 2024-04-06

## 2024-04-09 DIAGNOSIS — E11.65 TYPE 2 DIABETES MELLITUS WITH HYPERGLYCEMIA, WITHOUT LONG-TERM CURRENT USE OF INSULIN: Chronic | ICD-10-CM

## 2024-04-09 RX ORDER — PEN NEEDLE, DIABETIC 32GX 5/32"
NEEDLE, DISPOSABLE MISCELLANEOUS
Qty: 100 EACH | Refills: 3 | Status: SHIPPED | OUTPATIENT
Start: 2024-04-09

## 2024-04-09 NOTE — TELEPHONE ENCOUNTER
Caller: Catherine Moran    Relationship: Self    Best call back number: [phone]    Requested Prescriptions:   Requested Prescriptions     Pending Prescriptions Disp Refills    Insulin Pen Needle (BD Pen Needle Asya 2nd Gen) 32G X 4 MM misc 100 each 3     Sig: Use with pen device as instructed        Pharmacy where request should be sent: [facility]/PHARMACY #[id number] - [facility] [address] [phone] [facility] [phone] FX     Last office visit with prescribing clinician: 4/2/2024   Last telemedicine visit with prescribing clinician: Visit date not found   Next office visit with prescribing clinician: 8/5/2024     Additional details provided by patient: REQUESTING REFILL    Does the patient have less than a 3 day supply:  [x] Yes  [] No    Would you like a call back once the refill request has been completed: [x] Yes [] No    If the office needs to give you a call back, can they leave a voicemail: [x] Yes [] No    Josue Arellano Rep   04/09/24 10:14 EDT        Problem: Safety  Goal: Will remain free from falls    Intervention: Implement fall precautions  Call light and personal belongings within reach. Pt instructed to call for assistance, pt verbalizes understanding. Non skid socks on. Strip alarm in place. Bed in lowest position.

## 2024-06-12 DIAGNOSIS — E11.65 TYPE 2 DIABETES MELLITUS WITH HYPERGLYCEMIA, WITHOUT LONG-TERM CURRENT USE OF INSULIN: Chronic | ICD-10-CM

## 2024-06-13 RX ORDER — BLOOD SUGAR DIAGNOSTIC
STRIP MISCELLANEOUS
Qty: 50 EACH | Refills: 10 | Status: SHIPPED | OUTPATIENT
Start: 2024-06-13

## 2024-07-02 ENCOUNTER — TELEPHONE (OUTPATIENT)
Dept: FAMILY MEDICINE CLINIC | Facility: CLINIC | Age: 74
End: 2024-07-02
Payer: MEDICARE

## 2024-07-02 NOTE — TELEPHONE ENCOUNTER
Caller: Catherine Moran    Relationship: Self    Best call back number:[phone]    What is the best time to reach you: ANY TIME     Who are you requesting to speak with (clinical staff, provider,  specific staff member): CLINICAL       What was the call regarding: PATIENT CALLED STATING SHE RECEIVED A CALL FROM [phone] - THEY STATED THEY WERE COMING FROM [place] AND WOULD BE IN [place] AND NEEDS TO DO A MEDICARE HOME VISIT. PATIENT DID NOT THINK IT WAS LEGIT SO SHE WANTED TO CALL BECAUSE WHEN SHE ASKED WHAT WAS GOING ON THE PERSON HUNG UP. PLEASE ADVISE     PATIENT DOES NOT WANT ANYONE COMING TO HER HOME

## 2024-07-03 NOTE — TELEPHONE ENCOUNTER
Home visits are not done here anymore unsure if its done in other places but this will not be done by any of the providers in this office

## 2024-08-01 ENCOUNTER — LAB (OUTPATIENT)
Dept: FAMILY MEDICINE CLINIC | Facility: CLINIC | Age: 74
End: 2024-08-01
Payer: MEDICARE

## 2024-08-01 DIAGNOSIS — E11.29 TYPE 2 DIABETES MELLITUS WITH DIABETIC MICROALBUMINURIA, WITH LONG-TERM CURRENT USE OF INSULIN: ICD-10-CM

## 2024-08-01 DIAGNOSIS — Z79.4 TYPE 2 DIABETES MELLITUS WITH DIABETIC MICROALBUMINURIA, WITH LONG-TERM CURRENT USE OF INSULIN: ICD-10-CM

## 2024-08-01 DIAGNOSIS — Z79.4 TYPE 2 DIABETES MELLITUS WITH HYPERGLYCEMIA, WITH LONG-TERM CURRENT USE OF INSULIN: ICD-10-CM

## 2024-08-01 DIAGNOSIS — D64.9 MILD CHRONIC ANEMIA: ICD-10-CM

## 2024-08-01 DIAGNOSIS — E11.65 TYPE 2 DIABETES MELLITUS WITH HYPERGLYCEMIA, WITH LONG-TERM CURRENT USE OF INSULIN: ICD-10-CM

## 2024-08-01 DIAGNOSIS — R80.9 TYPE 2 DIABETES MELLITUS WITH DIABETIC MICROALBUMINURIA, WITH LONG-TERM CURRENT USE OF INSULIN: ICD-10-CM

## 2024-08-01 DIAGNOSIS — E78.2 HYPERLIPIDEMIA, MIXED: ICD-10-CM

## 2024-08-01 DIAGNOSIS — I10 HYPERTENSION, ESSENTIAL: ICD-10-CM

## 2024-08-01 DIAGNOSIS — D50.8 IRON DEFICIENCY ANEMIA SECONDARY TO INADEQUATE DIETARY IRON INTAKE: ICD-10-CM

## 2024-08-01 PROCEDURE — 36415 COLL VENOUS BLD VENIPUNCTURE: CPT | Performed by: FAMILY MEDICINE

## 2024-08-02 LAB
ALBUMIN SERPL-MCNC: 3.9 G/DL (ref 3.8–4.8)
ALP SERPL-CCNC: 104 IU/L (ref 44–121)
ALT SERPL-CCNC: 14 IU/L (ref 0–32)
AST SERPL-CCNC: 17 IU/L (ref 0–40)
BASOPHILS # BLD AUTO: 0 X10E3/UL (ref 0–0.2)
BASOPHILS NFR BLD AUTO: 1 %
BILIRUB SERPL-MCNC: 0.6 MG/DL (ref 0–1.2)
BUN SERPL-MCNC: 11 MG/DL (ref 8–27)
BUN/CREAT SERPL: 15 (ref 12–28)
CALCIUM SERPL-MCNC: 9.1 MG/DL (ref 8.7–10.3)
CHLORIDE SERPL-SCNC: 104 MMOL/L (ref 96–106)
CHOLEST SERPL-MCNC: 192 MG/DL (ref 100–199)
CO2 SERPL-SCNC: 22 MMOL/L (ref 20–29)
CREAT SERPL-MCNC: 0.75 MG/DL (ref 0.57–1)
EGFRCR SERPLBLD CKD-EPI 2021: 84 ML/MIN/1.73
EOSINOPHIL # BLD AUTO: 0.1 X10E3/UL (ref 0–0.4)
EOSINOPHIL NFR BLD AUTO: 2 %
ERYTHROCYTE [DISTWIDTH] IN BLOOD BY AUTOMATED COUNT: 14.1 % (ref 11.7–15.4)
FERRITIN SERPL-MCNC: 16 NG/ML (ref 15–150)
GLOBULIN SER CALC-MCNC: 2.7 G/DL (ref 1.5–4.5)
GLUCOSE SERPL-MCNC: 226 MG/DL (ref 70–99)
HBA1C MFR BLD: 10.7 % (ref 4.8–5.6)
HCT VFR BLD AUTO: 36.6 % (ref 34–46.6)
HDLC SERPL-MCNC: 39 MG/DL
HGB BLD-MCNC: 11.2 G/DL (ref 11.1–15.9)
IMM GRANULOCYTES # BLD AUTO: 0 X10E3/UL (ref 0–0.1)
IMM GRANULOCYTES NFR BLD AUTO: 0 %
IRON SATN MFR SERPL: 11 % (ref 15–55)
IRON SERPL-MCNC: 44 UG/DL (ref 27–139)
LDLC SERPL CALC-MCNC: 123 MG/DL (ref 0–99)
LYMPHOCYTES # BLD AUTO: 1.5 X10E3/UL (ref 0.7–3.1)
LYMPHOCYTES NFR BLD AUTO: 25 %
MCH RBC QN AUTO: 25.2 PG (ref 26.6–33)
MCHC RBC AUTO-ENTMCNC: 30.6 G/DL (ref 31.5–35.7)
MCV RBC AUTO: 82 FL (ref 79–97)
MONOCYTES # BLD AUTO: 0.6 X10E3/UL (ref 0.1–0.9)
MONOCYTES NFR BLD AUTO: 10 %
NEUTROPHILS # BLD AUTO: 3.9 X10E3/UL (ref 1.4–7)
NEUTROPHILS NFR BLD AUTO: 62 %
PLATELET # BLD AUTO: 331 X10E3/UL (ref 150–450)
POTASSIUM SERPL-SCNC: 4.8 MMOL/L (ref 3.5–5.2)
PROT SERPL-MCNC: 6.6 G/DL (ref 6–8.5)
RBC # BLD AUTO: 4.45 X10E6/UL (ref 3.77–5.28)
SODIUM SERPL-SCNC: 138 MMOL/L (ref 134–144)
T4 FREE SERPL-MCNC: 1.33 NG/DL (ref 0.82–1.77)
TIBC SERPL-MCNC: 388 UG/DL (ref 250–450)
TRIGL SERPL-MCNC: 169 MG/DL (ref 0–149)
TSH SERPL DL<=0.005 MIU/L-ACNC: 3.16 UIU/ML (ref 0.45–4.5)
UIBC SERPL-MCNC: 344 UG/DL (ref 118–369)
VLDLC SERPL CALC-MCNC: 30 MG/DL (ref 5–40)
WBC # BLD AUTO: 6.2 X10E3/UL (ref 3.4–10.8)

## 2024-08-05 ENCOUNTER — OFFICE VISIT (OUTPATIENT)
Dept: FAMILY MEDICINE CLINIC | Facility: CLINIC | Age: 74
End: 2024-08-05
Payer: MEDICARE

## 2024-08-05 VITALS
HEIGHT: 65 IN | BODY MASS INDEX: 28.29 KG/M2 | DIASTOLIC BLOOD PRESSURE: 74 MMHG | WEIGHT: 169.8 LBS | SYSTOLIC BLOOD PRESSURE: 148 MMHG | HEART RATE: 100 BPM | OXYGEN SATURATION: 99 %

## 2024-08-05 DIAGNOSIS — D64.9 MILD CHRONIC ANEMIA: ICD-10-CM

## 2024-08-05 DIAGNOSIS — E11.29 TYPE 2 DIABETES MELLITUS WITH DIABETIC MICROALBUMINURIA, WITH LONG-TERM CURRENT USE OF INSULIN: ICD-10-CM

## 2024-08-05 DIAGNOSIS — Z79.4 TYPE 2 DIABETES MELLITUS WITH DIABETIC MICROALBUMINURIA, WITH LONG-TERM CURRENT USE OF INSULIN: ICD-10-CM

## 2024-08-05 DIAGNOSIS — E78.2 HYPERLIPIDEMIA, MIXED: ICD-10-CM

## 2024-08-05 DIAGNOSIS — Z79.4 TYPE 2 DIABETES MELLITUS WITH HYPERGLYCEMIA, WITH LONG-TERM CURRENT USE OF INSULIN: Primary | ICD-10-CM

## 2024-08-05 DIAGNOSIS — I10 HYPERTENSION, ESSENTIAL: ICD-10-CM

## 2024-08-05 DIAGNOSIS — E11.65 TYPE 2 DIABETES MELLITUS WITH HYPERGLYCEMIA, WITH LONG-TERM CURRENT USE OF INSULIN: Primary | ICD-10-CM

## 2024-08-05 DIAGNOSIS — E66.3 OVERWEIGHT (BMI 25.0-29.9): ICD-10-CM

## 2024-08-05 DIAGNOSIS — D50.8 IRON DEFICIENCY ANEMIA SECONDARY TO INADEQUATE DIETARY IRON INTAKE: ICD-10-CM

## 2024-08-05 DIAGNOSIS — R80.9 TYPE 2 DIABETES MELLITUS WITH DIABETIC MICROALBUMINURIA, WITH LONG-TERM CURRENT USE OF INSULIN: ICD-10-CM

## 2024-08-05 PROCEDURE — 1126F AMNT PAIN NOTED NONE PRSNT: CPT | Performed by: FAMILY MEDICINE

## 2024-08-05 PROCEDURE — G2211 COMPLEX E/M VISIT ADD ON: HCPCS | Performed by: FAMILY MEDICINE

## 2024-08-05 PROCEDURE — 3046F HEMOGLOBIN A1C LEVEL >9.0%: CPT | Performed by: FAMILY MEDICINE

## 2024-08-05 PROCEDURE — 3077F SYST BP >= 140 MM HG: CPT | Performed by: FAMILY MEDICINE

## 2024-08-05 PROCEDURE — 1160F RVW MEDS BY RX/DR IN RCRD: CPT | Performed by: FAMILY MEDICINE

## 2024-08-05 PROCEDURE — 3078F DIAST BP <80 MM HG: CPT | Performed by: FAMILY MEDICINE

## 2024-08-05 PROCEDURE — 1159F MED LIST DOCD IN RCRD: CPT | Performed by: FAMILY MEDICINE

## 2024-08-05 PROCEDURE — 99214 OFFICE O/P EST MOD 30 MIN: CPT | Performed by: FAMILY MEDICINE

## 2024-08-05 RX ORDER — METFORMIN HYDROCHLORIDE 500 MG/1
500 TABLET, EXTENDED RELEASE ORAL
Qty: 90 TABLET | Refills: 1 | Status: SHIPPED | OUTPATIENT
Start: 2024-08-05

## 2024-08-05 RX ORDER — AMLODIPINE BESYLATE 5 MG/1
5 TABLET ORAL DAILY
Qty: 90 TABLET | Refills: 1 | Status: SHIPPED | OUTPATIENT
Start: 2024-08-05

## 2024-08-05 RX ORDER — VALSARTAN 320 MG/1
320 TABLET ORAL DAILY
Qty: 90 TABLET | Refills: 1 | Status: SHIPPED | OUTPATIENT
Start: 2024-08-05

## 2024-08-05 RX ORDER — GLIPIZIDE 10 MG/1
10 TABLET, FILM COATED, EXTENDED RELEASE ORAL DAILY
Qty: 90 TABLET | Refills: 1 | Status: SHIPPED | OUTPATIENT
Start: 2024-08-05

## 2024-08-05 NOTE — ASSESSMENT & PLAN NOTE
Diabetes is worsening.   Medication changes per orders.  Regular aerobic exercise.  Endocrinology clinic referral.  Diabetes will be reassessed  4 mos for AWV  Scheduling consult with Brinda

## 2024-08-05 NOTE — PROGRESS NOTES
"Chief Complaint  DM, HTN, Hyperlipidemia, history of mild anemia and chronic iron deficiency    Subjective    History of Present Illness:  Catherine Moran is a 73 y.o. female who presents today for followup visit and to review recent labs.    We did start her on insulin in July 2023 for her poorly controlled diabetes.  A1c with recent labs worsened again.  She has been reluctant to adjust up her insulin even with discussions about how to slowly adjust up insulin to prevent hypoglycemia but improve control.    She is on low-dose metformin given side-effects with higher dosing and glucotrolXL and has declined other Rx meds    Willing to see Endo given worsening DM control and concerns with going up on insulin.    Stressed given a call from her grandson today who is overseas in the  - BP up but wants to wait on med adjustment given stress is higher.     Ongoing follow-up with ophthalmology given episode of retinal vein occlusion with findings concerning for hypertensive retinopathy.  She had been reluctant to restart medications given side effects with lisinopril but we did add in amlodipine to her valsartan regimen last year and she has been doing well with home blood pressure checks with a combination of amlodipine and valsartan.  Her blood pressures have been consistently under 140/90.      We have discussed starting statin treatment again (she was on pravachol in the past) for cardiovascular risk reduction and aspirin and she declines.     She does understand increased stroke and cardiovascular risk off of statin and aspirin therapy.    Ongoing followup with hematology given chronic iron def anemia with neg past endoscopic workup.  Low-normal hgb and decreasing iron stores with recent labs - discussed she may need another iron infusion    Declines repeat colonoscopy     Objective   Vital Signs:   /74   Pulse 100   Ht 165.1 cm (65\")   Wt 77 kg (169 lb 12.8 oz)   SpO2 99%   BMI 28.26 kg/m² "     Review of Systems   Constitutional:  Negative for appetite change, chills and fever.   HENT:  Negative for hearing loss.    Eyes:  Negative for blurred vision.   Respiratory:  Negative for chest tightness.    Cardiovascular:  Negative for chest pain.   Gastrointestinal:  Negative for abdominal pain.   Musculoskeletal:  Negative for gait problem.   Skin:  Negative for rash.   Psychiatric/Behavioral:  Positive for stress. Negative for depressed mood.        Past History:  Medical History: has a past medical history of Anxiety, Benign essential hypertension, Impaired fasting glucose, Iron deficiency anemia, and Type 2 diabetes mellitus without complication.   Surgical History: has a past surgical history that includes Colonoscopy (07/24/2014); Esophagogastroduodenoscopy (07/24/2014); and Hemorrhoid surgery (07/31/2013).   Family History: family history includes Hypertension in her father and mother; Other in her father.   Social History: reports that she has never smoked. She has never used smokeless tobacco. She reports that she does not drink alcohol and does not use drugs.      Current Outpatient Medications:     Accu-Chek Softclix Lancets lancets, Use to check blood sugars up to once a day for non-insulin-dependent diabetes diagnosis E11.9, Disp: 100 each, Rfl: 3    amLODIPine (Norvasc) 5 MG tablet, Take 1 tablet by mouth Daily., Disp: 90 tablet, Rfl: 1    glipizide (GLUCOTROL XL) 10 MG 24 hr tablet, Take 1 tablet by mouth Daily., Disp: 90 tablet, Rfl: 1    glucose blood (Accu-Chek Arlette Plus) test strip, USE TO TEST BLOOD SUGAR ONCE DAILY, Disp: 50 each, Rfl: 10    Insulin Glargine (LANTUS SOLOSTAR) 100 UNIT/ML injection pen, 12U SC daily for diabetes.  If fasting glucose is over 200 increase by 2U daily.  If fasting blood sugar is above 140 but under 200 - increase by 1 unit daily.  Goal fasting blood sugar 130, Disp: 15 mL, Rfl: 5    Insulin Pen Needle (BD Pen Needle Asya 2nd Gen) 32G X 4 MM misc, Use with  pen device as instructed, Disp: 100 each, Rfl: 3    metFORMIN ER (GLUCOPHAGE-XR) 500 MG 24 hr tablet, Take 1 tablet by mouth Daily With Breakfast., Disp: 90 tablet, Rfl: 1    valsartan (DIOVAN) 320 MG tablet, Take 1 tablet by mouth Daily., Disp: 90 tablet, Rfl: 1    Allergies: Ace inhibitors    Physical Exam  Constitutional:       Appearance: Normal appearance.   HENT:      Head: Normocephalic.      Right Ear: External ear normal.      Left Ear: External ear normal.      Nose: Nose normal.   Eyes:      Pupils: Pupils are equal, round, and reactive to light.   Cardiovascular:      Rate and Rhythm: Normal rate and regular rhythm.      Heart sounds: Normal heart sounds.   Pulmonary:      Effort: Pulmonary effort is normal.      Breath sounds: Normal breath sounds.   Musculoskeletal:         General: Normal range of motion.      Cervical back: Normal range of motion.   Skin:     General: Skin is warm and dry.   Neurological:      General: No focal deficit present.      Mental Status: She is alert.   Psychiatric:         Mood and Affect: Mood normal.         Behavior: Behavior normal.         Thought Content: Thought content normal.      Comments: Increased stress with grandson overseas in the  but just more stressed today.  Doing well overall           Result Review                   Assessment and Plan  Diagnoses and all orders for this visit:    1. Type 2 diabetes mellitus with hyperglycemia, with long-term current use of insulin (Primary)  Assessment & Plan:  Diabetes is worsening.   Medication changes per orders.  Regular aerobic exercise.  Endocrinology clinic referral.  Diabetes will be reassessed  4 mos for AWV  Scheduling consult with Endo     Orders:  -     Ambulatory Referral to Endocrinology  -     glipizide (GLUCOTROL XL) 10 MG 24 hr tablet; Take 1 tablet by mouth Daily.  Dispense: 90 tablet; Refill: 1  -     Insulin Glargine (LANTUS SOLOSTAR) 100 UNIT/ML injection pen; 12U SC daily for diabetes.  If  fasting glucose is over 200 increase by 2U daily.  If fasting blood sugar is above 140 but under 200 - increase by 1 unit daily.  Goal fasting blood sugar 130  Dispense: 15 mL; Refill: 5  -     metFORMIN ER (GLUCOPHAGE-XR) 500 MG 24 hr tablet; Take 1 tablet by mouth Daily With Breakfast.  Dispense: 90 tablet; Refill: 1  -     CBC Auto Differential; Future  -     Comprehensive Metabolic Panel; Future  -     Lipid Panel; Future  -     Hemoglobin A1c; Future    2. Hypertension, essential  Assessment & Plan:  Stressed given a call from her grandson today who is overseas in the  - BP up but wants to wait on med adjustment given stress is higher.     Orders:  -     amLODIPine (Norvasc) 5 MG tablet; Take 1 tablet by mouth Daily.  Dispense: 90 tablet; Refill: 1  -     valsartan (DIOVAN) 320 MG tablet; Take 1 tablet by mouth Daily.  Dispense: 90 tablet; Refill: 1  -     CBC Auto Differential; Future  -     Comprehensive Metabolic Panel; Future  -     Lipid Panel; Future    3. Hyperlipidemia, mixed  Assessment & Plan:  Lipid abnormalities are unchanged.  Lipid-lowering therapy was not prescribed due to patient refusal, previous adverse reaction.  Lipids will be reassessed  4 mos      See above    Declines any Rx lipid medications.    Orders:  -     CBC Auto Differential; Future  -     Comprehensive Metabolic Panel; Future  -     Lipid Panel; Future    4. Mild chronic anemia  Assessment & Plan:  We will continue to monitor anemia lab work with future labs    Labs stable compared to past labs    Ongoing hematology followup ([facility])    Orders:  -     CBC Auto Differential; Future  -     Ferritin; Future  -     Iron Profile; Future    5. Iron deficiency anemia secondary to inadequate dietary iron intake  Assessment & Plan:  She has done well without need for recent iron infusion has had extensive work-up with endoscopy and hematology    Followup with [facility] - iron stores dropping with low-normal hgb, may need repeat iron  infusion     Orders:  -     CBC Auto Differential; Future  -     Ferritin; Future  -     Iron Profile; Future    6. Type 2 diabetes mellitus with diabetic microalbuminuria, with long-term current use of insulin  Assessment & Plan:  See above    Orders:  -     glipizide (GLUCOTROL XL) 10 MG 24 hr tablet; Take 1 tablet by mouth Daily.  Dispense: 90 tablet; Refill: 1  -     Insulin Glargine (LANTUS SOLOSTAR) 100 UNIT/ML injection pen; 12U SC daily for diabetes.  If fasting glucose is over 200 increase by 2U daily.  If fasting blood sugar is above 140 but under 200 - increase by 1 unit daily.  Goal fasting blood sugar 130  Dispense: 15 mL; Refill: 5  -     metFORMIN ER (GLUCOPHAGE-XR) 500 MG 24 hr tablet; Take 1 tablet by mouth Daily With Breakfast.  Dispense: 90 tablet; Refill: 1  -     valsartan (DIOVAN) 320 MG tablet; Take 1 tablet by mouth Daily.  Dispense: 90 tablet; Refill: 1  -     CBC Auto Differential; Future  -     Comprehensive Metabolic Panel; Future  -     Lipid Panel; Future  -     Hemoglobin A1c; Future    7. Overweight (BMI 25.0-29.9)  Assessment & Plan:  Patient's (Body mass index is 28.26 kg/m².) indicates that they are overweight with health conditions that include hypertension, diabetes mellitus, and dyslipidemias . Weight is improving with lifestyle modifications. BMI is is above average; BMI management plan is completed. We discussed portion control and increasing exercise.                      Follow Up  Return in about 4 months (around 12/5/2024) for Medicare Wellness, Fasting labs 1 week before apt (Drink water).    Adolph Hurst MD

## 2024-08-05 NOTE — ASSESSMENT & PLAN NOTE
Stressed given a call from her grandson today who is [place] in the  - BP up but wants to wait on med adjustment given stress is higher.

## 2024-08-05 NOTE — ASSESSMENT & PLAN NOTE
Patient's (Body mass index is 28.26 kg/m².) indicates that they are overweight with health conditions that include hypertension, diabetes mellitus, and dyslipidemias . Weight is improving with lifestyle modifications. BMI is is above average; BMI management plan is completed. We discussed portion control and increasing exercise.

## 2024-08-05 NOTE — ASSESSMENT & PLAN NOTE
She has done well without need for recent iron infusion has had extensive work-up with endoscopy and hematology    Followup with [facility] - iron stores dropping with low-normal hgb, may need repeat iron infusion

## 2024-08-05 NOTE — ASSESSMENT & PLAN NOTE
We will continue to monitor anemia lab work with future labs    Labs stable compared to past labs    [facility]

## 2024-08-14 ENCOUNTER — TELEPHONE (OUTPATIENT)
Dept: FAMILY MEDICINE CLINIC | Facility: CLINIC | Age: 74
End: 2024-08-14
Payer: MEDICARE

## 2024-08-14 NOTE — TELEPHONE ENCOUNTER
Patient called this morning in regards to her referral to Endocrinologist.  She stated that they asked her for her  and she was not going to give out to anyone over the phone.  She wanted to know if this was a legit referral and I assured her it was. I told her our Referral Coordinator reviews the insurance before she sends them to any other speciality doctor.  She stated that she might go ahead and make the appointment or she will talk to Dr. Hurst in December.

## 2024-08-16 DIAGNOSIS — E11.65 TYPE 2 DIABETES MELLITUS WITH HYPERGLYCEMIA, WITHOUT LONG-TERM CURRENT USE OF INSULIN: ICD-10-CM

## 2024-08-19 DIAGNOSIS — E11.65 TYPE 2 DIABETES MELLITUS WITH HYPERGLYCEMIA, WITHOUT LONG-TERM CURRENT USE OF INSULIN: ICD-10-CM

## 2024-08-19 RX ORDER — LANCETS
EACH MISCELLANEOUS
Qty: 100 EACH | Refills: 3 | Status: SHIPPED | OUTPATIENT
Start: 2024-08-19 | End: 2024-08-19 | Stop reason: SDUPTHER

## 2024-08-19 RX ORDER — LANCETS
EACH MISCELLANEOUS
Qty: 100 EACH | Refills: 3 | Status: SHIPPED | OUTPATIENT
Start: 2024-08-19

## 2024-08-19 NOTE — TELEPHONE ENCOUNTER
Caller: Catherine Moran    Relationship: Self    Best call back number: [phone]    Requested Prescriptions:   Requested Prescriptions     Pending Prescriptions Disp Refills    Accu-Chek Softclix Lancets lancets 100 each 3     Sig: Use to check blood sugars up to once a day for non-insulin-dependent diabetes diagnosis E11.9        Pharmacy where request should be sent:    [facility] [phone]  Last office visit with prescribing clinician: 8/5/2024   Last telemedicine visit with prescribing clinician: Visit date not found   Next office visit with prescribing clinician: 12/6/2024     Additional details provided by patient: PATIENT NEEDS REFILL    Does the patient have less than a 3 day supply:  [] Yes  [x] No    Would you like a call back once the refill request has been completed: [] Yes [x] No    If the office needs to give you a call back, can they leave a voicemail: [] Yes [x] No    Josue Yin   08/19/24 09:30 EDT

## 2024-09-30 ENCOUNTER — PATIENT OUTREACH (OUTPATIENT)
Dept: CASE MANAGEMENT | Facility: OTHER | Age: 74
End: 2024-09-30
Payer: MEDICARE

## 2024-09-30 DIAGNOSIS — I10 HYPERTENSION, ESSENTIAL: Chronic | ICD-10-CM

## 2024-09-30 DIAGNOSIS — Z79.4 TYPE 2 DIABETES MELLITUS WITH HYPERGLYCEMIA, WITH LONG-TERM CURRENT USE OF INSULIN: Primary | Chronic | ICD-10-CM

## 2024-09-30 DIAGNOSIS — E11.65 TYPE 2 DIABETES MELLITUS WITH HYPERGLYCEMIA, WITH LONG-TERM CURRENT USE OF INSULIN: Primary | Chronic | ICD-10-CM

## 2024-09-30 NOTE — OUTREACH NOTE
AMBULATORY CASE MANAGEMENT NOTE    Names and Relationships of Patient/Support Persons: Contact: Catherine Moran; Relationship: Self -     Patient Outreach    Spoke with patient. Introduced self and explained role. Discussed the purpose, goals, and          expectations of the program. Patient declined the CCM program.        Anh TORRES  Ambulatory Case Management    9/30/2024, 13:22 EDT

## 2024-12-02 ENCOUNTER — LAB (OUTPATIENT)
Dept: FAMILY MEDICINE CLINIC | Facility: CLINIC | Age: 74
End: 2024-12-02
Payer: MEDICARE

## 2024-12-02 DIAGNOSIS — E11.65 TYPE 2 DIABETES MELLITUS WITH HYPERGLYCEMIA, WITH LONG-TERM CURRENT USE OF INSULIN: ICD-10-CM

## 2024-12-02 DIAGNOSIS — Z79.4 TYPE 2 DIABETES MELLITUS WITH DIABETIC MICROALBUMINURIA, WITH LONG-TERM CURRENT USE OF INSULIN: ICD-10-CM

## 2024-12-02 DIAGNOSIS — D50.8 IRON DEFICIENCY ANEMIA SECONDARY TO INADEQUATE DIETARY IRON INTAKE: ICD-10-CM

## 2024-12-02 DIAGNOSIS — D64.9 MILD CHRONIC ANEMIA: ICD-10-CM

## 2024-12-02 DIAGNOSIS — R80.9 TYPE 2 DIABETES MELLITUS WITH DIABETIC MICROALBUMINURIA, WITH LONG-TERM CURRENT USE OF INSULIN: ICD-10-CM

## 2024-12-02 DIAGNOSIS — E78.2 HYPERLIPIDEMIA, MIXED: ICD-10-CM

## 2024-12-02 DIAGNOSIS — Z79.4 TYPE 2 DIABETES MELLITUS WITH HYPERGLYCEMIA, WITH LONG-TERM CURRENT USE OF INSULIN: ICD-10-CM

## 2024-12-02 DIAGNOSIS — I10 HYPERTENSION, ESSENTIAL: ICD-10-CM

## 2024-12-02 DIAGNOSIS — E11.29 TYPE 2 DIABETES MELLITUS WITH DIABETIC MICROALBUMINURIA, WITH LONG-TERM CURRENT USE OF INSULIN: ICD-10-CM

## 2024-12-03 LAB
ALBUMIN SERPL-MCNC: 4.1 G/DL (ref 3.8–4.8)
ALP SERPL-CCNC: 104 IU/L (ref 44–121)
ALT SERPL-CCNC: 21 IU/L (ref 0–32)
AST SERPL-CCNC: 18 IU/L (ref 0–40)
BASOPHILS # BLD AUTO: 0.1 X10E3/UL (ref 0–0.2)
BASOPHILS NFR BLD AUTO: 1 %
BILIRUB SERPL-MCNC: 0.6 MG/DL (ref 0–1.2)
BUN SERPL-MCNC: 12 MG/DL (ref 8–27)
BUN/CREAT SERPL: 17 (ref 12–28)
CALCIUM SERPL-MCNC: 9.4 MG/DL (ref 8.7–10.3)
CHLORIDE SERPL-SCNC: 101 MMOL/L (ref 96–106)
CHOLEST SERPL-MCNC: 216 MG/DL (ref 100–199)
CO2 SERPL-SCNC: 22 MMOL/L (ref 20–29)
CREAT SERPL-MCNC: 0.7 MG/DL (ref 0.57–1)
EGFRCR SERPLBLD CKD-EPI 2021: 91 ML/MIN/1.73
EOSINOPHIL # BLD AUTO: 0.1 X10E3/UL (ref 0–0.4)
EOSINOPHIL NFR BLD AUTO: 1 %
ERYTHROCYTE [DISTWIDTH] IN BLOOD BY AUTOMATED COUNT: 13 % (ref 11.7–15.4)
FERRITIN SERPL-MCNC: 20 NG/ML (ref 15–150)
GLOBULIN SER CALC-MCNC: 2.6 G/DL (ref 1.5–4.5)
GLUCOSE SERPL-MCNC: 213 MG/DL (ref 70–99)
HBA1C MFR BLD: 10.3 % (ref 4.8–5.6)
HCT VFR BLD AUTO: 38.7 % (ref 34–46.6)
HDLC SERPL-MCNC: 39 MG/DL
HGB BLD-MCNC: 12.5 G/DL (ref 11.1–15.9)
IMM GRANULOCYTES # BLD AUTO: 0 X10E3/UL (ref 0–0.1)
IMM GRANULOCYTES NFR BLD AUTO: 0 %
IRON SATN MFR SERPL: 13 % (ref 15–55)
IRON SERPL-MCNC: 47 UG/DL (ref 27–139)
LDLC SERPL CALC-MCNC: 143 MG/DL (ref 0–99)
LYMPHOCYTES # BLD AUTO: 1.6 X10E3/UL (ref 0.7–3.1)
LYMPHOCYTES NFR BLD AUTO: 19 %
MCH RBC QN AUTO: 27.4 PG (ref 26.6–33)
MCHC RBC AUTO-ENTMCNC: 32.3 G/DL (ref 31.5–35.7)
MCV RBC AUTO: 85 FL (ref 79–97)
MONOCYTES # BLD AUTO: 0.6 X10E3/UL (ref 0.1–0.9)
MONOCYTES NFR BLD AUTO: 7 %
NEUTROPHILS # BLD AUTO: 6.1 X10E3/UL (ref 1.4–7)
NEUTROPHILS NFR BLD AUTO: 72 %
PLATELET # BLD AUTO: 366 X10E3/UL (ref 150–450)
POTASSIUM SERPL-SCNC: 4.7 MMOL/L (ref 3.5–5.2)
PROT SERPL-MCNC: 6.7 G/DL (ref 6–8.5)
RBC # BLD AUTO: 4.57 X10E6/UL (ref 3.77–5.28)
SODIUM SERPL-SCNC: 136 MMOL/L (ref 134–144)
TIBC SERPL-MCNC: 375 UG/DL (ref 250–450)
TRIGL SERPL-MCNC: 185 MG/DL (ref 0–149)
UIBC SERPL-MCNC: 328 UG/DL (ref 118–369)
VLDLC SERPL CALC-MCNC: 34 MG/DL (ref 5–40)
WBC # BLD AUTO: 8.4 X10E3/UL (ref 3.4–10.8)

## 2024-12-03 NOTE — PROGRESS NOTES
THE MESSAGE BELOW IS ABLE TO BE GIVEN BY THE HUB.  THE HUB MAY SCHEDULE A FOLLOW-UP VISIT FOR THE PATIENT IF INDICATED IN THE MESSAGE BELOW...    Please let patient know that her recent lab work did show ongoing suboptimal diabetes control with hemoglobin A1c elevation at 10.3.    Please make sure that she keeps her visit with me as scheduled for December 5, 2024 for her Medicare annual wellness visit and physical exam along with discussion about adjustment in her diabetes regimen to improve her poorly controlled diabetes.    We will go over all her lab work together in detail at her visit later this week.

## 2024-12-05 ENCOUNTER — TELEPHONE (OUTPATIENT)
Dept: FAMILY MEDICINE CLINIC | Facility: CLINIC | Age: 74
End: 2024-12-05

## 2024-12-05 ENCOUNTER — OFFICE VISIT (OUTPATIENT)
Dept: FAMILY MEDICINE CLINIC | Facility: CLINIC | Age: 74
End: 2024-12-05
Payer: MEDICARE

## 2024-12-05 VITALS
WEIGHT: 170 LBS | SYSTOLIC BLOOD PRESSURE: 158 MMHG | OXYGEN SATURATION: 100 % | HEART RATE: 80 BPM | HEIGHT: 65 IN | BODY MASS INDEX: 28.32 KG/M2 | DIASTOLIC BLOOD PRESSURE: 84 MMHG

## 2024-12-05 DIAGNOSIS — E11.65 TYPE 2 DIABETES MELLITUS WITH HYPERGLYCEMIA, WITH LONG-TERM CURRENT USE OF INSULIN: Chronic | ICD-10-CM

## 2024-12-05 DIAGNOSIS — Z79.4 TYPE 2 DIABETES MELLITUS WITH HYPERGLYCEMIA, WITH LONG-TERM CURRENT USE OF INSULIN: Chronic | ICD-10-CM

## 2024-12-05 DIAGNOSIS — Z79.4 TYPE 2 DIABETES MELLITUS WITH DIABETIC MICROALBUMINURIA, WITH LONG-TERM CURRENT USE OF INSULIN: ICD-10-CM

## 2024-12-05 DIAGNOSIS — D64.9 MILD CHRONIC ANEMIA: ICD-10-CM

## 2024-12-05 DIAGNOSIS — D50.8 IRON DEFICIENCY ANEMIA SECONDARY TO INADEQUATE DIETARY IRON INTAKE: Chronic | ICD-10-CM

## 2024-12-05 DIAGNOSIS — I10 HYPERTENSION, ESSENTIAL: Chronic | ICD-10-CM

## 2024-12-05 DIAGNOSIS — Z12.11 SCREENING FOR COLON CANCER: ICD-10-CM

## 2024-12-05 DIAGNOSIS — E66.3 OVERWEIGHT (BMI 25.0-29.9): ICD-10-CM

## 2024-12-05 DIAGNOSIS — R80.9 TYPE 2 DIABETES MELLITUS WITH DIABETIC MICROALBUMINURIA, WITH LONG-TERM CURRENT USE OF INSULIN: ICD-10-CM

## 2024-12-05 DIAGNOSIS — E11.29 TYPE 2 DIABETES MELLITUS WITH DIABETIC MICROALBUMINURIA, WITH LONG-TERM CURRENT USE OF INSULIN: ICD-10-CM

## 2024-12-05 DIAGNOSIS — Z12.31 SCREENING MAMMOGRAM FOR BREAST CANCER: ICD-10-CM

## 2024-12-05 DIAGNOSIS — Z00.00 GENERAL MEDICAL EXAM: Primary | ICD-10-CM

## 2024-12-05 DIAGNOSIS — E78.2 HYPERLIPIDEMIA, MIXED: Chronic | ICD-10-CM

## 2024-12-05 PROCEDURE — 1126F AMNT PAIN NOTED NONE PRSNT: CPT | Performed by: FAMILY MEDICINE

## 2024-12-05 PROCEDURE — 1159F MED LIST DOCD IN RCRD: CPT | Performed by: FAMILY MEDICINE

## 2024-12-05 PROCEDURE — 99397 PER PM REEVAL EST PAT 65+ YR: CPT | Performed by: FAMILY MEDICINE

## 2024-12-05 PROCEDURE — 99214 OFFICE O/P EST MOD 30 MIN: CPT | Performed by: FAMILY MEDICINE

## 2024-12-05 PROCEDURE — 1160F RVW MEDS BY RX/DR IN RCRD: CPT | Performed by: FAMILY MEDICINE

## 2024-12-05 PROCEDURE — G0439 PPPS, SUBSEQ VISIT: HCPCS | Performed by: FAMILY MEDICINE

## 2024-12-05 PROCEDURE — 1170F FXNL STATUS ASSESSED: CPT | Performed by: FAMILY MEDICINE

## 2024-12-05 PROCEDURE — 3077F SYST BP >= 140 MM HG: CPT | Performed by: FAMILY MEDICINE

## 2024-12-05 PROCEDURE — 3046F HEMOGLOBIN A1C LEVEL >9.0%: CPT | Performed by: FAMILY MEDICINE

## 2024-12-05 PROCEDURE — 3079F DIAST BP 80-89 MM HG: CPT | Performed by: FAMILY MEDICINE

## 2024-12-05 PROCEDURE — 96160 PT-FOCUSED HLTH RISK ASSMT: CPT | Performed by: FAMILY MEDICINE

## 2024-12-05 RX ORDER — METFORMIN HYDROCHLORIDE 500 MG/1
500 TABLET, EXTENDED RELEASE ORAL
Qty: 90 TABLET | Refills: 1 | Status: SHIPPED | OUTPATIENT
Start: 2024-12-05

## 2024-12-05 RX ORDER — MUPIROCIN 20 MG/G
OINTMENT TOPICAL
COMMUNITY
Start: 2024-10-01

## 2024-12-05 RX ORDER — AMLODIPINE BESYLATE 10 MG/1
10 TABLET ORAL DAILY
Qty: 90 TABLET | Refills: 2 | Status: SHIPPED | OUTPATIENT
Start: 2024-12-05

## 2024-12-05 RX ORDER — GLIPIZIDE 10 MG/1
10 TABLET, FILM COATED, EXTENDED RELEASE ORAL
Qty: 90 TABLET | Refills: 1 | Status: SHIPPED | OUTPATIENT
Start: 2024-12-05

## 2024-12-05 RX ORDER — PEN NEEDLE, DIABETIC 32GX 5/32"
NEEDLE, DISPOSABLE MISCELLANEOUS
Qty: 100 EACH | Refills: 3 | Status: SHIPPED | OUTPATIENT
Start: 2024-12-05

## 2024-12-05 RX ORDER — GLIPIZIDE 5 MG/1
5 TABLET, FILM COATED, EXTENDED RELEASE ORAL
Qty: 90 TABLET | Refills: 1 | Status: SHIPPED | OUTPATIENT
Start: 2024-12-05

## 2024-12-05 RX ORDER — AMLODIPINE BESYLATE 5 MG/1
5 TABLET ORAL DAILY
Qty: 90 TABLET | Refills: 1 | Status: SHIPPED | OUTPATIENT
Start: 2024-12-05 | End: 2024-12-05 | Stop reason: SDUPTHER

## 2024-12-05 RX ORDER — VALSARTAN 320 MG/1
320 TABLET ORAL DAILY
Qty: 90 TABLET | Refills: 1 | Status: SHIPPED | OUTPATIENT
Start: 2024-12-05

## 2024-12-05 NOTE — TELEPHONE ENCOUNTER
Caller: Catherine Moran    Relationship: Self    Best call back number: [phone]     What was the call regarding:   PATIENT STATED THAT SHE WAS INFORMED BY THE PHARMACY THAT THEY ARE NEEDING TO BE INFORMED OF THE MAXIMUM DOSAGE OF THE MEDICATION IN ORDER FOR INSURANCE TO COVER     Insulin Glargine (LANTUS SOLOSTAR) 100 UNIT/ML injection pen

## 2024-12-05 NOTE — ASSESSMENT & PLAN NOTE
We will continue to monitor anemia lab work with future labs    Labs stable compared to past labs    [facility]

## 2024-12-05 NOTE — ASSESSMENT & PLAN NOTE
Patient's (Body mass index is 28.29 kg/m².) indicates that they are overweight with health conditions that include hypertension, diabetes mellitus, and dyslipidemias . Weight is improving with lifestyle modifications. BMI is is above average; BMI management plan is completed. We discussed portion control and increasing exercise.

## 2024-12-05 NOTE — ASSESSMENT & PLAN NOTE
Diabetes is worsening.   Medication changes per orders.  Regular aerobic exercise.  Endocrinology clinic referral.  Discussed Endo referral again today.  She declines  Diabetes will be reassessed  4 mos

## 2024-12-05 NOTE — ASSESSMENT & PLAN NOTE
Discussed together health maintenance and screening along with vaccination options and healthy diet and exercise habits as part of the preventative counseling at their physical exam today.     Declines colon cancer screening.  We did discuss Cologuard and colonoscopy today.    Declines screening mammogram.    Declines DEXA.    Declines vaccination update.    Encouraged advance directive    Discussed today poor diabetes control and she is willing to adjust up her glipizide to 15 mg daily and we discussed again how to slowly increase her Lantus to help with improvement in her diabetes control but prevent hypoglycemia.    She continues to decline endocrinology referral.    Discussed blood pressure remains elevated and she has already had problems with her vision related to hypertensive retinopathy.  She is willing for adjustment with amlodipine and we discussed monitoring blood pressures at home and let me know if her readings are staying above 140/90 for further medication adjustment.

## 2024-12-05 NOTE — TELEPHONE ENCOUNTER
Caller: Catherine Moran    Relationship: Self    Best call back number: [phone]    Which medication are you concerned about: NOT SURE. PHARMACY LEFT MESSAGE FOR PATIENT THAT THEIR IS A PROBLEM WITH ONE OF THE RX'S.    Who prescribed you this medication: DR POLLACK    When did you start taking this medication: NOT SURE    What are your concerns: [facility] DIDN'T SAY. BUT ADVISED PATIENT THEY'D REACHED OUT TO US TO GET ANSWER.    How long have you had these concerns: TODAY.    PATIENT NEEDS TO  MEDICATIONS TODAY, IF POSSIBLE.  CAN YOU PLEASE GET IN TOUCH WITH [facility] AS SOON AS POSSIBLE.    THANK YOU

## 2024-12-05 NOTE — ASSESSMENT & PLAN NOTE
See above.  Adjusted her regimen with increase in amlodipine to 10 mg.  Continue valsartan.  Encouraged home blood pressure checks and she will notify us if her blood pressure is staying above 140/90

## 2024-12-05 NOTE — PROGRESS NOTES
Subjective   The ABCs of the Annual Wellness Visit  Medicare Wellness Visit      Catherine Moran is a 74 y.o. patient who presents for a Medicare Wellness Visit.    The following portions of the patient's history were reviewed and   updated as appropriate: allergies, current medications, past family history, past medical history, past social history, past surgical history, and problem list.    Compared to one year ago, the patient's physical   health is the same.  Compared to one year ago, the patient's mental   health is the same.    Recent Hospitalizations:  She was not admitted to the hospital during the last year.     Current Medical Providers:  Patient Care Team:  Adolph Hurst MD as PCP - General (Family Medicine)  Tomas Erickson MD as Consulting Physician (Hematology and Oncology)  Ana Paula Moe OD (Optometry)    Outpatient Medications Prior to Visit   Medication Sig Dispense Refill    Accu-Chek Softclix Lancets lancets Use to check blood sugars up to once a day for non-insulin-dependent diabetes diagnosis E11.9 100 each 3    glucose blood (Accu-Chek Arlette Plus) test strip USE TO TEST BLOOD SUGAR ONCE DAILY 50 each 10    mupirocin (BACTROBAN) 2 % ointment APPLY TOPICALLY TWICE A DAY UNTIL HEALED.      amLODIPine (Norvasc) 5 MG tablet Take 1 tablet by mouth Daily. 90 tablet 1    glipizide (GLUCOTROL XL) 10 MG 24 hr tablet Take 1 tablet by mouth Daily. 90 tablet 1    Insulin Glargine (LANTUS SOLOSTAR) 100 UNIT/ML injection pen 12U SC daily for diabetes.  If fasting glucose is over 200 increase by 2U daily.  If fasting blood sugar is above 140 but under 200 - increase by 1 unit daily.  Goal fasting blood sugar 130 15 mL 5    Insulin Pen Needle (BD Pen Needle Asya 2nd Gen) 32G X 4 MM misc Use with pen device as instructed 100 each 3    metFORMIN ER (GLUCOPHAGE-XR) 500 MG 24 hr tablet Take 1 tablet by mouth Daily With Breakfast. 90 tablet 1    valsartan (DIOVAN) 320 MG tablet Take 1 tablet  "by mouth Daily. 90 tablet 1     No facility-administered medications prior to visit.     No opioid medication identified on active medication list. I have reviewed chart for other potential  high risk medication/s and harmful drug interactions in the elderly.      Aspirin is not on active medication list.  Aspirin use is contraindicated for this patient due to: history of bleeding.  .    Patient Active Problem List   Diagnosis    General medical exam    Type 2 diabetes mellitus with hyperglycemia, with long-term current use of insulin    Iron deficiency anemia secondary to inadequate dietary iron intake    Hypertension, essential    Hyperlipidemia, mixed    Screening mammogram for breast cancer    Screening for colon cancer    Mild chronic anemia    Overweight (BMI 25.0-29.9)    Type 2 diabetes mellitus with diabetic microalbuminuria, with long-term current use of insulin     Advance Care Planning Advance Directive is not on file.  ACP discussion was held with the patient during this visit. Patient does not have an advance directive, information provided.            Objective   Vitals:    12/05/24 1039 12/05/24 1106   BP: 160/80 158/84   Pulse: 80    SpO2: 100%    Weight: 77.1 kg (170 lb)    Height: 165.1 cm (65\")    PainSc: 0-No pain        Estimated body mass index is 28.29 kg/m² as calculated from the following:    Height as of this encounter: 165.1 cm (65\").    Weight as of this encounter: 77.1 kg (170 lb).            Does the patient have evidence of cognitive impairment? No  Lab Results   Component Value Date    CHLPL 216 (H) 12/02/2024    TRIG 185 (H) 12/02/2024    HDL 39 (L) 12/02/2024     (H) 12/02/2024    VLDL 34 12/02/2024    HGBA1C 10.3 (H) 12/02/2024                                                                                                Health  Risk Assessment    Smoking Status:  Social History     Tobacco Use   Smoking Status Never   Smokeless Tobacco Never     Alcohol " Consumption:  Social History     Substance and Sexual Activity   Alcohol Use Never       Fall Risk Screen  STEADI Fall Risk Assessment was completed, and patient is at LOW risk for falls.Assessment completed on:2024    Depression Screening   Little interest or pleasure in doing things? Not at all   Feeling down, depressed, or hopeless? Not at all   PHQ-2 Total Score 0      Health Habits and Functional and Cognitive Screenin/5/2024    10:38 AM   Functional & Cognitive Status   Do you have difficulty preparing food and eating? No   Do you have difficulty bathing yourself, getting dressed or grooming yourself? No   Do you have difficulty using the toilet? No   Do you have difficulty moving around from place to place? No   Do you have trouble with steps or getting out of a bed or a chair? No   Current Diet Well Balanced Diet   Dental Exam Up to date   Eye Exam Up to date   Exercise (times per week) 0 times per week   Current Exercises Include No Regular Exercise   Do you need help using the phone?  No   Are you deaf or do you have serious difficulty hearing?  No   Do you need help to go to places out of walking distance? No   Do you need help shopping? No   Do you need help preparing meals?  No   Do you need help with housework?  No   Do you need help with laundry? No   Do you need help taking your medications? No   Do you need help managing money? No   Do you ever drive or ride in a car without wearing a seat belt? No   Have you felt unusual stress, anger or loneliness in the last month? No   Who do you live with? Alone   If you need help, do you have trouble finding someone available to you? No   Have you been bothered in the last four weeks by sexual problems? No   Do you have difficulty concentrating, remembering or making decisions? No           Age-appropriate Screening Schedule:  Refer to the list below for future screening recommendations based on patient's age, sex and/or medical conditions.  Orders for these recommended tests are listed in the plan section. The patient has been provided with a written plan.    Health Maintenance List  Health Maintenance   Topic Date Due    DIABETIC EYE EXAM  04/15/2025    HEMOGLOBIN A1C  06/02/2025    LIPID PANEL  12/02/2025    ANNUAL WELLNESS VISIT  12/05/2025    DIABETIC FOOT EXAM  12/05/2025    BMI FOLLOWUP  12/05/2025    TDAP/TD VACCINES (4 - Td or Tdap) 03/22/2032    HEPATITIS C SCREENING  Discontinued    COVID-19 Vaccine  Discontinued    INFLUENZA VACCINE  Discontinued    Pneumococcal Vaccine 65+  Discontinued    MAMMOGRAM  Discontinued    URINE MICROALBUMIN  Discontinued    DXA SCAN  Discontinued    ZOSTER VACCINE  Discontinued    COLORECTAL CANCER SCREENING  Discontinued                                                                                                                                                CMS Preventative Services Quick Reference  Risk Factors Identified During Encounter  Fall Risk-High or Moderate: Discussed Fall Prevention in the home    The above risks/problems have been discussed with the patient.  Pertinent information has been shared with the patient in the After Visit Summary.  An After Visit Summary and PPPS were made available to the patient.    Follow Up:   Next Medicare Wellness visit to be scheduled in 1 year.         Additional E&M Note during same encounter follows:  Patient has additional, significant, and separately identifiable condition(s)/problem(s) that require work above and beyond the Medicare Wellness Visit     Chief Complaint  DM, HTN, Hyperlipidemia, history of mild anemia and chronic iron deficiency    Subjective   HPI  Catherine is also being seen today for an annual adult preventative physical exam.  and Catherine is also being seen today for additional medical problem/s.  DM, HTN, Hyperlipidemia, history of mild anemia and chronic iron deficiency    Catherine Moran is a 74 y.o. female who presents today for  followup visit and to review recent labs.    We did start her on insulin in July 2023 for her poorly controlled diabetes.  A1c with recent labs remains poor at 10.3.    We did schedule her to see endocrinology after her last visit but she canceled this appointment given that took several months to get in with endocrinology.    We did discuss at length again today problems related to poorly controlled diabetes that are often irreversible including cardiovascular and stroke risk along with diabetic nephropathy and diabetic retinopathy.    She declines endocrinology consultation and we did discuss this again today.    She has been reluctant to adjust up her insulin even with discussions about how to slowly adjust up insulin to prevent hypoglycemia but improve control.  She is willing to adjust up her glipizide to 15 mg daily and we discussed again today how to adjust up her Lantus to get her fasting blood sugars into the 130 range and improve her A1c.  She does report that she will work on insulin adjustment and increase her glipizide dosing.    She is on low-dose metformin given side-effects with higher dosing and glucotrolXL and has declined other Rx meds    Blood pressure does remain suboptimally controlled and she is willing for adjustment with her amlodipine.  She does continue on valsartan.  Home blood pressure checks tend to be in the 140s but most readings are still above 140 systolic.    Ongoing follow-up with ophthalmology given episode of retinal vein occlusion with findings concerning for hypertensive retinopathy.  She had been reluctant to restart medications given side effects with lisinopril but we did add in amlodipine to her valsartan regimen last year and she has been doing well with home blood pressure checks with a combination of amlodipine and valsartan.      We have discussed starting statin treatment again (she was on pravachol in the past) for cardiovascular risk reduction and aspirin and she  "declines.     She does understand increased stroke and cardiovascular risk off of statin and aspirin therapy.    Ongoing followup with hematology given chronic iron def anemia with neg past endoscopic workup.  Low-normal hgb and decreasing iron stores with recent labs - discussed she may need another iron infusion    Declines repeat colonoscopy.  Declines Cologuard      Review of Systems   Constitutional:  Negative for activity change, appetite change, chills, fatigue and fever.   HENT:  Negative for ear pain, hearing loss and trouble swallowing.    Eyes:  Negative for pain and visual disturbance.   Respiratory:  Negative for cough, chest tightness, shortness of breath and wheezing.    Cardiovascular:  Negative for chest pain, palpitations and leg swelling.   Gastrointestinal:  Negative for abdominal pain and blood in stool.   Genitourinary:  Negative for difficulty urinating.   Musculoskeletal:  Negative for arthralgias, back pain and joint swelling.   Skin:  Negative for rash.   Neurological:  Negative for dizziness, weakness and light-headedness.   Psychiatric/Behavioral:  Negative for agitation, behavioral problems, dysphoric mood and sleep disturbance.               Objective   Vital Signs:  /84   Pulse 80   Ht 165.1 cm (65\")   Wt 77.1 kg (170 lb)   SpO2 100%   BMI 28.29 kg/m²   Physical Exam  Constitutional:       Appearance: Normal appearance.   HENT:      Head: Normocephalic.      Right Ear: External ear normal.      Left Ear: External ear normal.      Nose: Nose normal.   Eyes:      Pupils: Pupils are equal, round, and reactive to light.   Cardiovascular:      Rate and Rhythm: Normal rate and regular rhythm.      Heart sounds: Normal heart sounds. No murmur heard.     No friction rub. No gallop.   Pulmonary:      Effort: Pulmonary effort is normal.      Breath sounds: Normal breath sounds.   Musculoskeletal:         General: Normal range of motion.      Cervical back: Normal range of motion.      " Right foot: Normal range of motion. No deformity, bunion or foot drop.      Left foot: Normal range of motion. No deformity, bunion or foot drop.   Feet:      Right foot:      Protective Sensation: 10 sites tested.  10 sites sensed.      Skin integrity: Skin integrity normal.      Toenail Condition: Right toenails are normal.      Left foot:      Protective Sensation: 10 sites tested.  10 sites sensed.      Skin integrity: Skin integrity normal.      Toenail Condition: Left toenails are normal.      Comments: Diabetic Foot Exam Performed and Monofilament Test Performed     Skin:     General: Skin is warm and dry.   Neurological:      General: No focal deficit present.      Mental Status: She is alert.   Psychiatric:         Mood and Affect: Mood normal.         Behavior: Behavior normal.         Thought Content: Thought content normal.                       Assessment and Plan       Diagnoses and all orders for this visit:    1. General medical exam (Primary)  Assessment & Plan:  Discussed together health maintenance and screening along with vaccination options and healthy diet and exercise habits as part of the preventative counseling at their physical exam today.     Declines colon cancer screening.  We did discuss Cologuard and colonoscopy today.    Declines screening mammogram.    Declines DEXA.    Declines vaccination update.    Encouraged advance directive    Discussed today poor diabetes control and she is willing to adjust up her glipizide to 15 mg daily and we discussed again how to slowly increase her Lantus to help with improvement in her diabetes control but prevent hypoglycemia.    She continues to decline endocrinology referral.    Discussed blood pressure remains elevated and she has already had problems with her vision related to hypertensive retinopathy.  She is willing for adjustment with amlodipine and we discussed monitoring blood pressures at home and let me know if her readings are staying  above 140/90 for further medication adjustment.          2. Screening for colon cancer  Assessment & Plan:  Declines      3. Screening mammogram for breast cancer  Assessment & Plan:  Declines      4. Type 2 diabetes mellitus with hyperglycemia, with long-term current use of insulin  Assessment & Plan:  Diabetes is worsening.   Medication changes per orders.  Regular aerobic exercise.  Endocrinology clinic referral.  Discussed Endo referral again today.  She declines  Diabetes will be reassessed  4 mos     Orders:  -     glipizide (Glucotrol XL) 5 MG ER tablet; Take 1 tablet by mouth Daily With Breakfast. (Take with 10mg for total of 15mg daily for diabetes control)  Dispense: 90 tablet; Refill: 1  -     metFORMIN ER (GLUCOPHAGE-XR) 500 MG 24 hr tablet; Take 1 tablet by mouth Daily With Breakfast.  Dispense: 90 tablet; Refill: 1  -     Insulin Glargine (LANTUS SOLOSTAR) 100 UNIT/ML injection pen; 14U SC daily for diabetes.  If fasting glucose is over 200 increase by 2U daily.  If fasting blood sugar is above 140 but under 200 - increase by 1 unit daily.  Goal fasting blood sugar 130  Dispense: 15 mL; Refill: 5  -     glipizide (GLUCOTROL XL) 10 MG 24 hr tablet; Take 1 tablet by mouth Daily With Breakfast. (Take with 5 mg for total of 15mg daily for diabetes control)  Dispense: 90 tablet; Refill: 1  -     Insulin Pen Needle (BD Pen Needle Asya 2nd Gen) 32G X 4 MM misc; Use with pen device as instructed  Dispense: 100 each; Refill: 3  -     valsartan (DIOVAN) 320 MG tablet; Take 1 tablet by mouth Daily.  Dispense: 90 tablet; Refill: 1  -     CBC & Differential; Future  -     Comprehensive Metabolic Panel; Future  -     Lipid Panel; Future  -     Hemoglobin A1c; Future    5. Type 2 diabetes mellitus with diabetic microalbuminuria, with long-term current use of insulin  Assessment & Plan:  See above        Orders:  -     glipizide (Glucotrol XL) 5 MG ER tablet; Take 1 tablet by mouth Daily With Breakfast. (Take with  10mg for total of 15mg daily for diabetes control)  Dispense: 90 tablet; Refill: 1  -     metFORMIN ER (GLUCOPHAGE-XR) 500 MG 24 hr tablet; Take 1 tablet by mouth Daily With Breakfast.  Dispense: 90 tablet; Refill: 1  -     Insulin Glargine (LANTUS SOLOSTAR) 100 UNIT/ML injection pen; 14U SC daily for diabetes.  If fasting glucose is over 200 increase by 2U daily.  If fasting blood sugar is above 140 but under 200 - increase by 1 unit daily.  Goal fasting blood sugar 130  Dispense: 15 mL; Refill: 5  -     glipizide (GLUCOTROL XL) 10 MG 24 hr tablet; Take 1 tablet by mouth Daily With Breakfast. (Take with 5 mg for total of 15mg daily for diabetes control)  Dispense: 90 tablet; Refill: 1  -     Insulin Pen Needle (BD Pen Needle Asya 2nd Gen) 32G X 4 MM misc; Use with pen device as instructed  Dispense: 100 each; Refill: 3  -     valsartan (DIOVAN) 320 MG tablet; Take 1 tablet by mouth Daily.  Dispense: 90 tablet; Refill: 1  -     CBC & Differential; Future  -     Comprehensive Metabolic Panel; Future  -     Lipid Panel; Future  -     Hemoglobin A1c; Future    6. Hypertension, essential  Assessment & Plan:  See above.  Adjusted her regimen with increase in amlodipine to 10 mg.  Continue valsartan.  Encouraged home blood pressure checks and she will notify us if her blood pressure is staying above 140/90    Orders:  -     Discontinue: amLODIPine (Norvasc) 5 MG tablet; Take 1 tablet by mouth Daily.  Dispense: 90 tablet; Refill: 1  -     valsartan (DIOVAN) 320 MG tablet; Take 1 tablet by mouth Daily.  Dispense: 90 tablet; Refill: 1  -     CBC & Differential; Future  -     Comprehensive Metabolic Panel; Future  -     Lipid Panel; Future  -     amLODIPine (Norvasc) 10 MG tablet; Take 1 tablet by mouth Daily.  Dispense: 90 tablet; Refill: 2    7. Hyperlipidemia, mixed  Assessment & Plan:  Lipid abnormalities are unchanged.  Lipid-lowering therapy was not prescribed due to patient refusal, previous adverse reaction.  Lipids  will be reassessed  4 mos      See above    Declines any Rx lipid medications.    Orders:  -     CBC & Differential; Future  -     Comprehensive Metabolic Panel; Future  -     Lipid Panel; Future    8. Mild chronic anemia  Assessment & Plan:  We will continue to monitor anemia lab work with future labs    Labs stable compared to past labs    Ongoing hematology followup ([facility])    Orders:  -     CBC & Differential; Future  -     Ferritin; Future  -     Iron Profile; Future    9. Iron deficiency anemia secondary to inadequate dietary iron intake  Assessment & Plan:  She has done well without need for recent iron infusion has had extensive work-up with endoscopy and hematology    Followup with [facility] - iron stores dropping with low-normal hgb, may need repeat iron infusion     Orders:  -     CBC & Differential; Future  -     Ferritin; Future  -     Iron Profile; Future    10. Overweight (BMI 25.0-29.9)  Assessment & Plan:  Patient's (Body mass index is 28.29 kg/m².) indicates that they are overweight with health conditions that include hypertension, diabetes mellitus, and dyslipidemias . Weight is improving with lifestyle modifications. BMI is is above average; BMI management plan is completed. We discussed portion control and increasing exercise.              Follow Up   Return in about 4 months (around 4/5/2025) for Med recheck, Fasting labs 1 week before apt (Drink water).  Patient was given instructions and counseling regarding her condition or for health maintenance advice. Please see specific information pulled into the AVS if appropriate.

## 2024-12-06 ENCOUNTER — TELEPHONE (OUTPATIENT)
Dept: FAMILY MEDICINE CLINIC | Facility: CLINIC | Age: 74
End: 2024-12-06

## 2024-12-06 NOTE — TELEPHONE ENCOUNTER
Spoke to pharmacy. Rx sig and dispense quantity was clarified with pharmacist. Patient will not get 6 boxes of insulin.  She will only get 1. Patient notified.

## 2024-12-06 NOTE — TELEPHONE ENCOUNTER
Caller: Catherine Moran    Relationship: Self    Best call back number:   Requested Prescriptions:      Insulin Glargine (LANTUS SOLOSTAR) 100 UNIT/ML injection pen     Pharmacy where request should be sent: [facility]/PHARMACY #[id number] - [place], [place] - [address] - [phone]  - [phone] FX     Last office visit with prescribing clinician: 12/5/2024   Last telemedicine visit with prescribing clinician: Visit date not found   Next office visit with prescribing clinician: 4/11/2025     Additional details provided by patient: PHARMACY IS NEEDING MAXIMUM DOSAGE ON THIS MEDICATION AND PATIENT WAS CALLING TO SEE IF THIS HAS BEEN RESOLVED AND SENT TO THE PHARMACY    Does the patient have less than a 3 day supply:  [] Yes  [x] No    Would you like a call back once the refill request has been completed: [x] Yes [] No    If the office needs to give you a call back, can they leave a voicemail: [x] Yes [] No    Josue Marin Rep   12/06/24 11:34 EST

## 2024-12-06 NOTE — TELEPHONE ENCOUNTER
Patient states she went to [facility] to  her insulin and they have her prescription as 100 unites or 6 boxes. She normally takes 14 unites a day and its normally only in one box. Can we resend

## 2024-12-07 NOTE — TELEPHONE ENCOUNTER
Patient called back.  Patient needs to talk to you.  Please call patient.    There was some  misunderstanding by pharmacy.

## 2024-12-09 NOTE — TELEPHONE ENCOUNTER
Patient does not want to go to endocrinology.  Patient's is concerned  about insulin instructions saying 100u max does. Explained to patient that  100 U max is the maximum dose.

## 2025-02-10 ENCOUNTER — PATIENT OUTREACH (OUTPATIENT)
Dept: CASE MANAGEMENT | Facility: OTHER | Age: 75
End: 2025-02-10
Payer: MEDICARE

## 2025-02-10 ENCOUNTER — TELEPHONE (OUTPATIENT)
Dept: CASE MANAGEMENT | Facility: OTHER | Age: 75
End: 2025-02-10
Payer: MEDICARE

## 2025-02-10 DIAGNOSIS — Z79.4 TYPE 2 DIABETES MELLITUS WITH HYPERGLYCEMIA, WITH LONG-TERM CURRENT USE OF INSULIN: Primary | Chronic | ICD-10-CM

## 2025-02-10 DIAGNOSIS — E11.65 TYPE 2 DIABETES MELLITUS WITH HYPERGLYCEMIA, WITH LONG-TERM CURRENT USE OF INSULIN: Primary | Chronic | ICD-10-CM

## 2025-02-10 NOTE — OUTREACH NOTE
AMBULATORY CASE MANAGEMENT NOTE    Names and Relationships of Patient/Support Persons: Contact: Catherine Moran; Relationship: Self -     CCM Interim Update    Contacted patient to discuss CCM services. Patient is not interested at this time. No additional needs.        Education Documentation  No documentation found.        Kaylee SPEAR  Ambulatory Case Management    2/10/2025, 11:45 EST

## 2025-03-02 DIAGNOSIS — Z79.4 TYPE 2 DIABETES MELLITUS WITH HYPERGLYCEMIA, WITH LONG-TERM CURRENT USE OF INSULIN: Chronic | ICD-10-CM

## 2025-03-02 DIAGNOSIS — R80.9 TYPE 2 DIABETES MELLITUS WITH DIABETIC MICROALBUMINURIA, WITH LONG-TERM CURRENT USE OF INSULIN: ICD-10-CM

## 2025-03-02 DIAGNOSIS — E11.29 TYPE 2 DIABETES MELLITUS WITH DIABETIC MICROALBUMINURIA, WITH LONG-TERM CURRENT USE OF INSULIN: ICD-10-CM

## 2025-03-02 DIAGNOSIS — Z79.4 TYPE 2 DIABETES MELLITUS WITH DIABETIC MICROALBUMINURIA, WITH LONG-TERM CURRENT USE OF INSULIN: ICD-10-CM

## 2025-03-02 DIAGNOSIS — E11.65 TYPE 2 DIABETES MELLITUS WITH HYPERGLYCEMIA, WITH LONG-TERM CURRENT USE OF INSULIN: Chronic | ICD-10-CM

## 2025-03-03 RX ORDER — INSULIN GLARGINE 100 [IU]/ML
INJECTION, SOLUTION SUBCUTANEOUS
Qty: 15 ML | Refills: 5 | Status: SHIPPED | OUTPATIENT
Start: 2025-03-03

## 2025-04-08 ENCOUNTER — LAB (OUTPATIENT)
Dept: FAMILY MEDICINE CLINIC | Facility: CLINIC | Age: 75
End: 2025-04-08
Payer: MEDICARE

## 2025-04-08 DIAGNOSIS — R80.9 TYPE 2 DIABETES MELLITUS WITH DIABETIC MICROALBUMINURIA, WITH LONG-TERM CURRENT USE OF INSULIN: ICD-10-CM

## 2025-04-08 DIAGNOSIS — E78.2 HYPERLIPIDEMIA, MIXED: Chronic | ICD-10-CM

## 2025-04-08 DIAGNOSIS — Z79.4 TYPE 2 DIABETES MELLITUS WITH DIABETIC MICROALBUMINURIA, WITH LONG-TERM CURRENT USE OF INSULIN: ICD-10-CM

## 2025-04-08 DIAGNOSIS — E11.65 TYPE 2 DIABETES MELLITUS WITH HYPERGLYCEMIA, WITH LONG-TERM CURRENT USE OF INSULIN: Chronic | ICD-10-CM

## 2025-04-08 DIAGNOSIS — I10 HYPERTENSION, ESSENTIAL: Chronic | ICD-10-CM

## 2025-04-08 DIAGNOSIS — Z79.4 TYPE 2 DIABETES MELLITUS WITH HYPERGLYCEMIA, WITH LONG-TERM CURRENT USE OF INSULIN: Chronic | ICD-10-CM

## 2025-04-08 DIAGNOSIS — D64.9 MILD CHRONIC ANEMIA: ICD-10-CM

## 2025-04-08 DIAGNOSIS — E11.29 TYPE 2 DIABETES MELLITUS WITH DIABETIC MICROALBUMINURIA, WITH LONG-TERM CURRENT USE OF INSULIN: ICD-10-CM

## 2025-04-08 DIAGNOSIS — D50.8 IRON DEFICIENCY ANEMIA SECONDARY TO INADEQUATE DIETARY IRON INTAKE: Chronic | ICD-10-CM

## 2025-04-09 ENCOUNTER — RESULTS FOLLOW-UP (OUTPATIENT)
Dept: FAMILY MEDICINE CLINIC | Facility: CLINIC | Age: 75
End: 2025-04-09
Payer: MEDICARE

## 2025-04-09 LAB
ALBUMIN SERPL-MCNC: 3.9 G/DL (ref 3.8–4.8)
ALP SERPL-CCNC: 90 IU/L (ref 44–121)
ALT SERPL-CCNC: 18 IU/L (ref 0–32)
AST SERPL-CCNC: 23 IU/L (ref 0–40)
BASOPHILS # BLD AUTO: 0 X10E3/UL (ref 0–0.2)
BASOPHILS NFR BLD AUTO: 1 %
BILIRUB SERPL-MCNC: 0.8 MG/DL (ref 0–1.2)
BUN SERPL-MCNC: 13 MG/DL (ref 8–27)
BUN/CREAT SERPL: 17 (ref 12–28)
CALCIUM SERPL-MCNC: 9.4 MG/DL (ref 8.7–10.3)
CHLORIDE SERPL-SCNC: 101 MMOL/L (ref 96–106)
CHOLEST SERPL-MCNC: 190 MG/DL (ref 100–199)
CO2 SERPL-SCNC: 23 MMOL/L (ref 20–29)
CREAT SERPL-MCNC: 0.75 MG/DL (ref 0.57–1)
EGFRCR SERPLBLD CKD-EPI 2021: 83 ML/MIN/1.73
EOSINOPHIL # BLD AUTO: 0 X10E3/UL (ref 0–0.4)
EOSINOPHIL NFR BLD AUTO: 1 %
ERYTHROCYTE [DISTWIDTH] IN BLOOD BY AUTOMATED COUNT: 14.4 % (ref 11.7–15.4)
GLOBULIN SER CALC-MCNC: 2.4 G/DL (ref 1.5–4.5)
GLUCOSE SERPL-MCNC: 176 MG/DL (ref 70–99)
HBA1C MFR BLD: 9.4 % (ref 4.8–5.6)
HCT VFR BLD AUTO: 35.6 % (ref 34–46.6)
HDLC SERPL-MCNC: 36 MG/DL
HGB BLD-MCNC: 11.5 G/DL (ref 11.1–15.9)
IMM GRANULOCYTES # BLD AUTO: 0 X10E3/UL (ref 0–0.1)
IMM GRANULOCYTES NFR BLD AUTO: 0 %
IRON SATN MFR SERPL: 16 % (ref 15–55)
IRON SERPL-MCNC: 55 UG/DL (ref 27–139)
LDLC SERPL CALC-MCNC: 121 MG/DL (ref 0–99)
LYMPHOCYTES # BLD AUTO: 1.1 X10E3/UL (ref 0.7–3.1)
LYMPHOCYTES NFR BLD AUTO: 19 %
MCH RBC QN AUTO: 27.5 PG (ref 26.6–33)
MCHC RBC AUTO-ENTMCNC: 32.3 G/DL (ref 31.5–35.7)
MCV RBC AUTO: 85 FL (ref 79–97)
MONOCYTES # BLD AUTO: 0.2 X10E3/UL (ref 0.1–0.9)
MONOCYTES NFR BLD AUTO: 4 %
NEUTROPHILS # BLD AUTO: 4.1 X10E3/UL (ref 1.4–7)
NEUTROPHILS NFR BLD AUTO: 75 %
PLATELET # BLD AUTO: 354 X10E3/UL (ref 150–450)
POTASSIUM SERPL-SCNC: 4.7 MMOL/L (ref 3.5–5.2)
PROT SERPL-MCNC: 6.3 G/DL (ref 6–8.5)
RBC # BLD AUTO: 4.18 X10E6/UL (ref 3.77–5.28)
SODIUM SERPL-SCNC: 137 MMOL/L (ref 134–144)
TIBC SERPL-MCNC: 346 UG/DL (ref 250–450)
TRIGL SERPL-MCNC: 183 MG/DL (ref 0–149)
UIBC SERPL-MCNC: 291 UG/DL (ref 118–369)
VLDLC SERPL CALC-MCNC: 33 MG/DL (ref 5–40)
WBC # BLD AUTO: 5.4 X10E3/UL (ref 3.4–10.8)

## 2025-04-09 NOTE — PROGRESS NOTES
Please fax a copy of her lab work results to Dr. Erickson with hematology.  She did stop by earlier today to see if her labs were back but they were not back until just recently.    Please make sure that she keeps her follow-up visit with me as scheduled for April 11, 2025 and please remind her to make sure she is well-hydrated at follow-up because we do need to get a check on her urine testing.    Her A1c was better at 9.4 but still above goal.  Our goal for good diabetes control is to keep her A1c under 7.    Her lipid panel returned with mild improvement in her cholesterol but her LDL does remain above goal at 121.  Our goal for diabetes control is to keep her LDL under 100.    Her metabolic panel returned with good kidney function, normal liver enzymes, good electrolytes, and elevated blood sugar at 176 on the morning of her labs.    Her iron saturation returned in the low normal range at 16%.    Her blood count returned normal with no anemia or evidence for infection.

## 2025-04-09 NOTE — TELEPHONE ENCOUNTER
"Contacted pt to let pt know that pt's pcp states:    \"Please make sure that she keeps her follow-up visit with me as scheduled for April 11, 2025 and please remind her to make sure she is well-hydrated at follow-up because we do need to get a check on her urine testing.     Her A1c was better at 9.4 but still above goal.  Our goal for good diabetes control is to keep her A1c under 7.     Her lipid panel returned with mild improvement in her cholesterol but her LDL does remain above goal at 121.  Our goal for diabetes control is to keep her LDL under 100.     Her metabolic panel returned with good kidney function, normal liver enzymes, good electrolytes, and elevated blood sugar at 176 on the morning of her labs.     Her iron saturation returned in the low normal range at 16%.     Her blood count returned normal with no anemia or evidence for infection.\"    Pt verbally understood and BOZENA relayed too that a copy of recent lab results were faxed through [facility] [employer] [facility] to Dr. Erickson's office before CMA called pt.  "

## 2025-04-11 ENCOUNTER — OFFICE VISIT (OUTPATIENT)
Dept: FAMILY MEDICINE CLINIC | Facility: CLINIC | Age: 75
End: 2025-04-11
Payer: MEDICARE

## 2025-04-11 VITALS
OXYGEN SATURATION: 96 % | BODY MASS INDEX: 27.44 KG/M2 | DIASTOLIC BLOOD PRESSURE: 70 MMHG | SYSTOLIC BLOOD PRESSURE: 124 MMHG | HEIGHT: 65 IN | WEIGHT: 164.7 LBS | HEART RATE: 95 BPM

## 2025-04-11 DIAGNOSIS — E66.3 OVERWEIGHT (BMI 25.0-29.9): ICD-10-CM

## 2025-04-11 DIAGNOSIS — I10 HYPERTENSION, ESSENTIAL: ICD-10-CM

## 2025-04-11 DIAGNOSIS — E78.2 HYPERLIPIDEMIA, MIXED: ICD-10-CM

## 2025-04-11 DIAGNOSIS — Z79.4 TYPE 2 DIABETES MELLITUS WITH DIABETIC MICROALBUMINURIA, WITH LONG-TERM CURRENT USE OF INSULIN: ICD-10-CM

## 2025-04-11 DIAGNOSIS — C50.912 MALIGNANT NEOPLASM OF LEFT BREAST IN FEMALE, ESTROGEN RECEPTOR NEGATIVE, UNSPECIFIED SITE OF BREAST: ICD-10-CM

## 2025-04-11 DIAGNOSIS — Z17.1 MALIGNANT NEOPLASM OF LEFT BREAST IN FEMALE, ESTROGEN RECEPTOR NEGATIVE, UNSPECIFIED SITE OF BREAST: ICD-10-CM

## 2025-04-11 DIAGNOSIS — E11.65 TYPE 2 DIABETES MELLITUS WITH HYPERGLYCEMIA, WITH LONG-TERM CURRENT USE OF INSULIN: Primary | ICD-10-CM

## 2025-04-11 DIAGNOSIS — Z79.4 TYPE 2 DIABETES MELLITUS WITH HYPERGLYCEMIA, WITH LONG-TERM CURRENT USE OF INSULIN: Primary | ICD-10-CM

## 2025-04-11 DIAGNOSIS — E11.29 TYPE 2 DIABETES MELLITUS WITH DIABETIC MICROALBUMINURIA, WITH LONG-TERM CURRENT USE OF INSULIN: ICD-10-CM

## 2025-04-11 DIAGNOSIS — R80.9 TYPE 2 DIABETES MELLITUS WITH DIABETIC MICROALBUMINURIA, WITH LONG-TERM CURRENT USE OF INSULIN: ICD-10-CM

## 2025-04-11 DIAGNOSIS — D64.9 MILD CHRONIC ANEMIA: ICD-10-CM

## 2025-04-11 DIAGNOSIS — D50.8 IRON DEFICIENCY ANEMIA SECONDARY TO INADEQUATE DIETARY IRON INTAKE: ICD-10-CM

## 2025-04-11 PROBLEM — C50.919 INFILTRATING DUCTAL CARCINOMA OF BREAST: Status: ACTIVE | Noted: 2025-04-11

## 2025-04-11 LAB
EXPIRATION DATE: ABNORMAL
Lab: ABNORMAL
POC ALBUMIN, URINE: 30 MG/L
POC CREATININE, URINE: 50 MG/DL
POC URINE ALB/CREA RATIO: ABNORMAL

## 2025-04-11 RX ORDER — AMLODIPINE BESYLATE 10 MG/1
10 TABLET ORAL DAILY
Qty: 90 TABLET | Refills: 2 | Status: SHIPPED | OUTPATIENT
Start: 2025-04-11

## 2025-04-11 RX ORDER — GLIPIZIDE 10 MG/1
10 TABLET, FILM COATED, EXTENDED RELEASE ORAL
Qty: 90 TABLET | Refills: 1 | Status: SHIPPED | OUTPATIENT
Start: 2025-04-11

## 2025-04-11 RX ORDER — INSULIN GLARGINE 100 [IU]/ML
INJECTION, SOLUTION SUBCUTANEOUS
Qty: 15 ML | Refills: 5 | Status: SHIPPED | OUTPATIENT
Start: 2025-04-11

## 2025-04-11 RX ORDER — GLIPIZIDE 5 MG/1
5 TABLET, FILM COATED, EXTENDED RELEASE ORAL
Qty: 90 TABLET | Refills: 1 | Status: SHIPPED | OUTPATIENT
Start: 2025-04-11

## 2025-04-11 RX ORDER — METFORMIN HYDROCHLORIDE 500 MG/1
500 TABLET, EXTENDED RELEASE ORAL
Qty: 90 TABLET | Refills: 1 | Status: SHIPPED | OUTPATIENT
Start: 2025-04-11

## 2025-04-11 RX ORDER — PEN NEEDLE, DIABETIC 32GX 5/32"
NEEDLE, DISPOSABLE MISCELLANEOUS
Qty: 100 EACH | Refills: 3 | Status: SHIPPED | OUTPATIENT
Start: 2025-04-11

## 2025-04-11 RX ORDER — VALSARTAN 320 MG/1
320 TABLET ORAL DAILY
Qty: 90 TABLET | Refills: 1 | Status: SHIPPED | OUTPATIENT
Start: 2025-04-11

## 2025-04-11 NOTE — PROGRESS NOTES
"Chief Complaint  L breast cancer (infiltrative ductal triple negative), DM, HTN, Hyperlipidemia, history of mild anemia and chronic iron deficiency    Subjective    History of Present Illness:  Catherine Moran is a 74 y.o. female who presents today for followup visit and to review recent labs.    We did start her on insulin in July 2023 for her poorly controlled diabetes.  A1c with recent labs improved but above goal - but she is on steroids for chemotherapy with Dr Erickson for newly diagnosed L breast cancer.    She is on low-dose metformin given side-effects with higher dosing and glucotrolXL and has declined other Rx meds    Blood pressure does remain improved with med adjustment last visit.     Ongoing follow-up with ophthalmology given episode of retinal vein occlusion with findings concerning for hypertensive retinopathy.  She had been reluctant to restart medications given side effects with lisinopril but we did add in amlodipine to her valsartan regimen last year and she has been doing well with home blood pressure checks with a combination of amlodipine and valsartan.      We have discussed starting statin treatment again (she was on pravachol in the past) for cardiovascular risk reduction and aspirin and she declines.     She does understand increased stroke and cardiovascular risk off of statin and aspirin therapy.    Ongoing followup with hematology given chronic iron def anemia with neg past endoscopic workup along with new breast cancer diagnosis.      Declines repeat colonoscopy.  Declines Cologuard    Objective   Vital Signs:   /70 (BP Location: Right arm, Patient Position: Sitting, Cuff Size: Adult)   Pulse 95   Ht 165.1 cm (65\")   Wt 74.7 kg (164 lb 11.2 oz)   SpO2 96%   BMI 27.41 kg/m²     Review of Systems   Constitutional:  Negative for appetite change, chills and fever.   HENT:  Negative for hearing loss.    Eyes:  Negative for blurred vision.   Respiratory:  Negative for chest " tightness.    Cardiovascular:  Negative for chest pain.   Gastrointestinal:  Negative for abdominal pain.   Musculoskeletal:  Negative for gait problem.   Skin:  Negative for rash.   Psychiatric/Behavioral:  Positive for stress. Negative for depressed mood.        Past History:  Medical History: has a past medical history of Anxiety, Benign essential hypertension, Impaired fasting glucose, Iron deficiency anemia, and Type 2 diabetes mellitus without complication.   Surgical History: has a past surgical history that includes Colonoscopy (07/24/2014); Esophagogastroduodenoscopy (07/24/2014); and Hemorrhoid surgery (07/31/2013).   Family History: family history includes Hypertension in her father and mother; Other in her father.   Social History: reports that she has never smoked. She has never used smokeless tobacco. She reports that she does not drink alcohol and does not use drugs.      Current Outpatient Medications:     Accu-Chek Softclix Lancets lancets, Use to check blood sugars up to once a day for non-insulin-dependent diabetes diagnosis E11.9, Disp: 100 each, Rfl: 3    amLODIPine (Norvasc) 10 MG tablet, Take 1 tablet by mouth Daily., Disp: 90 tablet, Rfl: 2    glipizide (GLUCOTROL XL) 10 MG 24 hr tablet, Take 1 tablet by mouth Daily With Breakfast. (Take with 5 mg for total of 15mg daily for diabetes control), Disp: 90 tablet, Rfl: 1    glipizide (Glucotrol XL) 5 MG ER tablet, Take 1 tablet by mouth Daily With Breakfast. (Take with 10mg for total of 15mg daily for diabetes control), Disp: 90 tablet, Rfl: 1    glucose blood (Accu-Chek Arlette Plus) test strip, USE TO TEST BLOOD SUGAR ONCE DAILY, Disp: 50 each, Rfl: 10    Insulin Glargine (Lantus SoloStar) 100 UNIT/ML injection pen, 14U SC DAILY FOR DIABETES. IF FASTING GLUCOSE IS OVER 200 INCREASE BY 2U DAILY. IF FASTING BLOOD SUGAR IS ABOVE 140 BUT UNDER 200 - INCREASE BY 1 UNIT DAILY. GOAL FASTING BLOOD SUGAR 130. MAX 100U DAILY, Disp: 15 mL, Rfl: 5    Insulin  Pen Needle (BD Pen Needle Asya 2nd Gen) 32G X 4 MM misc, Use with pen device as instructed, Disp: 100 each, Rfl: 3    metFORMIN ER (GLUCOPHAGE-XR) 500 MG 24 hr tablet, Take 1 tablet by mouth Daily With Breakfast., Disp: 90 tablet, Rfl: 1    mupirocin (BACTROBAN) 2 % ointment, APPLY TOPICALLY TWICE A DAY UNTIL HEALED., Disp: , Rfl:     Pembrolizumab (KEYTRUDA) 100 MG/4ML solution, Infuse  into a venous catheter., Disp: , Rfl:     valsartan (DIOVAN) 320 MG tablet, Take 1 tablet by mouth Daily., Disp: 90 tablet, Rfl: 1    Allergies: Ace inhibitors and Statins    Physical Exam  Constitutional:       Appearance: Normal appearance.   HENT:      Head: Normocephalic.      Right Ear: External ear normal.      Left Ear: External ear normal.      Nose: Nose normal.   Eyes:      Pupils: Pupils are equal, round, and reactive to light.   Cardiovascular:      Rate and Rhythm: Normal rate and regular rhythm.      Heart sounds: Normal heart sounds.   Pulmonary:      Effort: Pulmonary effort is normal.      Breath sounds: Normal breath sounds.   Musculoskeletal:         General: Normal range of motion.      Cervical back: Normal range of motion.   Skin:     General: Skin is warm and dry.   Neurological:      General: No focal deficit present.      Mental Status: She is alert.   Psychiatric:         Mood and Affect: Mood normal.         Behavior: Behavior normal.         Thought Content: Thought content normal.          Result Review                   Assessment and Plan  Diagnoses and all orders for this visit:    1. Type 2 diabetes mellitus with hyperglycemia, with long-term current use of insulin (Primary)  Assessment & Plan:  Diabetes is improving with treatment.   Medication changes per orders.  Regular aerobic exercise.        Diabetes will be reassessed  4 mos     A1c above goal but improving, hopefully when she is done with steroids and chemo tx this will improve - room to adjust insulin and glucotrol discussed     Orders:  -      POC Albumin/Creatinine Ratio Urine  -     glipizide (GLUCOTROL XL) 10 MG 24 hr tablet; Take 1 tablet by mouth Daily With Breakfast. (Take with 5 mg for total of 15mg daily for diabetes control)  Dispense: 90 tablet; Refill: 1  -     glipizide (Glucotrol XL) 5 MG ER tablet; Take 1 tablet by mouth Daily With Breakfast. (Take with 10mg for total of 15mg daily for diabetes control)  Dispense: 90 tablet; Refill: 1  -     Insulin Glargine (Lantus SoloStar) 100 UNIT/ML injection pen; 14U SC DAILY FOR DIABETES. IF FASTING GLUCOSE IS OVER 200 INCREASE BY 2U DAILY. IF FASTING BLOOD SUGAR IS ABOVE 140 BUT UNDER 200 - INCREASE BY 1 UNIT DAILY. GOAL FASTING BLOOD SUGAR 130. MAX 100U DAILY  Dispense: 15 mL; Refill: 5  -     Insulin Pen Needle (BD Pen Needle Asya 2nd Gen) 32G X 4 MM misc; Use with pen device as instructed  Dispense: 100 each; Refill: 3  -     metFORMIN ER (GLUCOPHAGE-XR) 500 MG 24 hr tablet; Take 1 tablet by mouth Daily With Breakfast.  Dispense: 90 tablet; Refill: 1  -     valsartan (DIOVAN) 320 MG tablet; Take 1 tablet by mouth Daily.  Dispense: 90 tablet; Refill: 1    2. Type 2 diabetes mellitus with diabetic microalbuminuria, with long-term current use of insulin  Assessment & Plan:  See above        Orders:  -     POC Albumin/Creatinine Ratio Urine  -     glipizide (GLUCOTROL XL) 10 MG 24 hr tablet; Take 1 tablet by mouth Daily With Breakfast. (Take with 5 mg for total of 15mg daily for diabetes control)  Dispense: 90 tablet; Refill: 1  -     glipizide (Glucotrol XL) 5 MG ER tablet; Take 1 tablet by mouth Daily With Breakfast. (Take with 10mg for total of 15mg daily for diabetes control)  Dispense: 90 tablet; Refill: 1  -     Insulin Glargine (Lantus SoloStar) 100 UNIT/ML injection pen; 14U SC DAILY FOR DIABETES. IF FASTING GLUCOSE IS OVER 200 INCREASE BY 2U DAILY. IF FASTING BLOOD SUGAR IS ABOVE 140 BUT UNDER 200 - INCREASE BY 1 UNIT DAILY. GOAL FASTING BLOOD SUGAR 130. MAX 100U DAILY  Dispense: 15 mL;  Refill: 5  -     Insulin Pen Needle (BD Pen Needle Asya 2nd Gen) 32G X 4 MM misc; Use with pen device as instructed  Dispense: 100 each; Refill: 3  -     metFORMIN ER (GLUCOPHAGE-XR) 500 MG 24 hr tablet; Take 1 tablet by mouth Daily With Breakfast.  Dispense: 90 tablet; Refill: 1  -     valsartan (DIOVAN) 320 MG tablet; Take 1 tablet by mouth Daily.  Dispense: 90 tablet; Refill: 1    3. Malignant neoplasm of left breast in female, estrogen receptor negative, unspecified site of breast  Assessment & Plan:  Triple neg breast cancer    Followup with Dr Erickson    Ongoing chemo currently with plan for surgery and then radiation tx.       4. Hypertension, essential  Assessment & Plan:  Hypertension is stable and controlled  Continue current treatment regimen.  Regular aerobic exercise.  Blood pressure will be reassessed  4 mos .    Orders:  -     amLODIPine (Norvasc) 10 MG tablet; Take 1 tablet by mouth Daily.  Dispense: 90 tablet; Refill: 2  -     valsartan (DIOVAN) 320 MG tablet; Take 1 tablet by mouth Daily.  Dispense: 90 tablet; Refill: 1    5. Hyperlipidemia, mixed  Assessment & Plan:  Lipid abnormalities are unchanged.  Lipid-lowering therapy was not prescribed due to patient refusal, previous adverse reaction.  Lipids will be reassessed  4 mos      See above    Declines any Rx lipid medications.      6. Mild chronic anemia  Assessment & Plan:  We will continue to monitor anemia lab work with future labs    Labs stable compared to past labs    Ongoing hematology followup ([facility])      7. Iron deficiency anemia secondary to inadequate dietary iron intake  Assessment & Plan:  Cont monitoring and hematology followup       8. Overweight (BMI 25.0-29.9)  Assessment & Plan:  Patient's (Body mass index is 27.41 kg/m².) indicates that they are overweight with health conditions that include hypertension, diabetes mellitus, and dyslipidemias . Weight is improving with lifestyle modifications. BMI is is above average; BMI  management plan is completed. We discussed portion control and increasing exercise.                      Follow Up  Return in about 4 months (around 8/11/2025) for Med recheck.    Adolph Hurst MD

## 2025-04-11 NOTE — ASSESSMENT & PLAN NOTE
We will continue to monitor anemia lab work with future labs    Labs stable compared to past labs    [facility]

## 2025-04-11 NOTE — ASSESSMENT & PLAN NOTE
Diabetes is improving with treatment.   Medication changes per orders.  Regular aerobic exercise.        Diabetes will be reassessed  4 mos     A1c above goal but improving, hopefully when she is done with steroids and chemo tx this will improve - room to adjust insulin and glucotrol discussed

## 2025-04-11 NOTE — ASSESSMENT & PLAN NOTE
Patient's (Body mass index is 27.41 kg/m².) indicates that they are overweight with health conditions that include hypertension, diabetes mellitus, and dyslipidemias . Weight is improving with lifestyle modifications. BMI is is above average; BMI management plan is completed. We discussed portion control and increasing exercise.

## 2025-04-11 NOTE — ASSESSMENT & PLAN NOTE
Triple neg breast cancer    Followup with Dr Erickson    Ongoing chemo currently with plan for surgery and then radiation tx.

## 2025-04-16 ENCOUNTER — TELEPHONE (OUTPATIENT)
Dept: FAMILY MEDICINE CLINIC | Facility: CLINIC | Age: 75
End: 2025-04-16
Payer: MEDICARE

## 2025-04-16 DIAGNOSIS — R80.9 TYPE 2 DIABETES MELLITUS WITH DIABETIC MICROALBUMINURIA, WITH LONG-TERM CURRENT USE OF INSULIN: ICD-10-CM

## 2025-04-16 DIAGNOSIS — Z79.4 TYPE 2 DIABETES MELLITUS WITH HYPERGLYCEMIA, WITH LONG-TERM CURRENT USE OF INSULIN: ICD-10-CM

## 2025-04-16 DIAGNOSIS — E11.29 TYPE 2 DIABETES MELLITUS WITH DIABETIC MICROALBUMINURIA, WITH LONG-TERM CURRENT USE OF INSULIN: ICD-10-CM

## 2025-04-16 DIAGNOSIS — Z79.4 TYPE 2 DIABETES MELLITUS WITH DIABETIC MICROALBUMINURIA, WITH LONG-TERM CURRENT USE OF INSULIN: ICD-10-CM

## 2025-04-16 DIAGNOSIS — E11.65 TYPE 2 DIABETES MELLITUS WITH HYPERGLYCEMIA, WITH LONG-TERM CURRENT USE OF INSULIN: ICD-10-CM

## 2025-04-16 RX ORDER — INSULIN GLARGINE 100 [IU]/ML
INJECTION, SOLUTION SUBCUTANEOUS
Qty: 15 ML | Refills: 5 | Status: SHIPPED | OUTPATIENT
Start: 2025-04-16

## 2025-04-16 NOTE — TELEPHONE ENCOUNTER
Caller: Catherine Moran    Relationship: Self    Best call back number: [phone]    Which medication are you concerned about: PREETHI HUSSEIN    Who prescribed you this medication: DR POLLACK    What are your concerns: [facility] STATES NEED TO LOWER MAXIMUM DAILY DOSE IN ORDER FOR PATIENT TO GET LESS BOXES. CURRENTLY WRITTEN  UNITS WHICH IS MAXIMUM DOSE. PATIENT WOULD HAVE RECEIVED 6 BOXES FOR 90 DAY SUPPLY WHEN SHE NORMALLY GETS 1 BOX FOR 90 DAY SUPPLY. PATIENT STATES SHE THE HIGHEST DOSE SHE HAS EVER TAKEN WAS 14 UNITS. PRESCRIPTION NEEDS TO BE FIXED.   PATIENT DID NOT  THE 6 BOXES AND NEEDS NEW PRESCRIPTION SENT TO [facility].

## 2025-05-09 ENCOUNTER — EXTERNAL PBMM DATA (OUTPATIENT)
Dept: PHARMACY | Facility: OTHER | Age: 75
End: 2025-05-09
Payer: MEDICARE

## 2025-08-15 ENCOUNTER — OFFICE VISIT (OUTPATIENT)
Dept: FAMILY MEDICINE CLINIC | Facility: CLINIC | Age: 75
End: 2025-08-15
Payer: MEDICARE

## 2025-08-15 VITALS
BODY MASS INDEX: 25.54 KG/M2 | DIASTOLIC BLOOD PRESSURE: 74 MMHG | HEIGHT: 65 IN | SYSTOLIC BLOOD PRESSURE: 118 MMHG | OXYGEN SATURATION: 97 % | HEART RATE: 89 BPM | WEIGHT: 153.3 LBS

## 2025-08-15 DIAGNOSIS — I10 HYPERTENSION, ESSENTIAL: ICD-10-CM

## 2025-08-15 DIAGNOSIS — Z17.1 MALIGNANT NEOPLASM OF LEFT BREAST IN FEMALE, ESTROGEN RECEPTOR NEGATIVE, UNSPECIFIED SITE OF BREAST: ICD-10-CM

## 2025-08-15 DIAGNOSIS — E11.65 TYPE 2 DIABETES MELLITUS WITH HYPERGLYCEMIA, WITH LONG-TERM CURRENT USE OF INSULIN: Primary | ICD-10-CM

## 2025-08-15 DIAGNOSIS — D64.9 MILD CHRONIC ANEMIA: ICD-10-CM

## 2025-08-15 DIAGNOSIS — R80.9 TYPE 2 DIABETES MELLITUS WITH DIABETIC MICROALBUMINURIA, WITH LONG-TERM CURRENT USE OF INSULIN: ICD-10-CM

## 2025-08-15 DIAGNOSIS — E11.29 TYPE 2 DIABETES MELLITUS WITH DIABETIC MICROALBUMINURIA, WITH LONG-TERM CURRENT USE OF INSULIN: ICD-10-CM

## 2025-08-15 DIAGNOSIS — E66.3 OVERWEIGHT (BMI 25.0-29.9): ICD-10-CM

## 2025-08-15 DIAGNOSIS — E78.2 HYPERLIPIDEMIA, MIXED: ICD-10-CM

## 2025-08-15 DIAGNOSIS — Z79.4 TYPE 2 DIABETES MELLITUS WITH DIABETIC MICROALBUMINURIA, WITH LONG-TERM CURRENT USE OF INSULIN: ICD-10-CM

## 2025-08-15 DIAGNOSIS — C50.912 MALIGNANT NEOPLASM OF LEFT BREAST IN FEMALE, ESTROGEN RECEPTOR NEGATIVE, UNSPECIFIED SITE OF BREAST: ICD-10-CM

## 2025-08-15 DIAGNOSIS — Z79.4 TYPE 2 DIABETES MELLITUS WITH HYPERGLYCEMIA, WITH LONG-TERM CURRENT USE OF INSULIN: Primary | ICD-10-CM

## 2025-08-15 DIAGNOSIS — D50.8 IRON DEFICIENCY ANEMIA SECONDARY TO INADEQUATE DIETARY IRON INTAKE: ICD-10-CM

## 2025-08-15 LAB
EXPIRATION DATE: ABNORMAL
HBA1C MFR BLD: 8.5 % (ref 4.5–5.7)
Lab: ABNORMAL

## 2025-08-15 RX ORDER — AMLODIPINE BESYLATE 10 MG/1
10 TABLET ORAL DAILY
Qty: 90 TABLET | Refills: 1 | Status: SHIPPED | OUTPATIENT
Start: 2025-08-15

## 2025-08-15 RX ORDER — VALSARTAN 320 MG/1
320 TABLET ORAL DAILY
Qty: 90 TABLET | Refills: 1 | Status: SHIPPED | OUTPATIENT
Start: 2025-08-15

## 2025-08-15 RX ORDER — GLIPIZIDE 10 MG/1
10 TABLET, FILM COATED, EXTENDED RELEASE ORAL
Qty: 90 TABLET | Refills: 1 | Status: SHIPPED | OUTPATIENT
Start: 2025-08-15

## 2025-08-15 RX ORDER — GLIPIZIDE 5 MG/1
5 TABLET, FILM COATED, EXTENDED RELEASE ORAL
Qty: 90 TABLET | Refills: 1 | Status: SHIPPED | OUTPATIENT
Start: 2025-08-15

## 2025-08-15 RX ORDER — METFORMIN HYDROCHLORIDE 500 MG/1
500 TABLET, EXTENDED RELEASE ORAL
Qty: 90 TABLET | Refills: 1 | Status: SHIPPED | OUTPATIENT
Start: 2025-08-15

## 2025-08-19 DIAGNOSIS — I10 HYPERTENSION, ESSENTIAL: Chronic | ICD-10-CM

## 2025-08-19 RX ORDER — AMLODIPINE BESYLATE 5 MG/1
5 TABLET ORAL DAILY
Qty: 90 TABLET | Refills: 1 | OUTPATIENT
Start: 2025-08-19